# Patient Record
Sex: FEMALE | Race: WHITE | NOT HISPANIC OR LATINO | Employment: OTHER | URBAN - NONMETROPOLITAN AREA
[De-identification: names, ages, dates, MRNs, and addresses within clinical notes are randomized per-mention and may not be internally consistent; named-entity substitution may affect disease eponyms.]

---

## 2018-04-30 ENCOUNTER — TRANSCRIBE ORDERS (OUTPATIENT)
Dept: CARDIOLOGY | Facility: CLINIC | Age: 43
End: 2018-04-30

## 2018-04-30 DIAGNOSIS — R00.2 PALPITATIONS: ICD-10-CM

## 2018-04-30 DIAGNOSIS — R55 SYNCOPE AND COLLAPSE: Primary | ICD-10-CM

## 2018-05-03 ENCOUNTER — CONSULT (OUTPATIENT)
Dept: CARDIOLOGY | Facility: CLINIC | Age: 43
End: 2018-05-03

## 2018-05-03 VITALS
DIASTOLIC BLOOD PRESSURE: 70 MMHG | SYSTOLIC BLOOD PRESSURE: 100 MMHG | HEIGHT: 60 IN | HEART RATE: 58 BPM | BODY MASS INDEX: 32.98 KG/M2 | WEIGHT: 168 LBS

## 2018-05-03 DIAGNOSIS — R56.9 SEIZURE (HCC): ICD-10-CM

## 2018-05-03 DIAGNOSIS — R00.2 PALPITATIONS: ICD-10-CM

## 2018-05-03 DIAGNOSIS — R42 DIZZINESS: ICD-10-CM

## 2018-05-03 DIAGNOSIS — R00.1 BRADYCARDIA: ICD-10-CM

## 2018-05-03 DIAGNOSIS — R55 SYNCOPE, UNSPECIFIED SYNCOPE TYPE: Primary | ICD-10-CM

## 2018-05-03 DIAGNOSIS — R01.1 MURMUR, CARDIAC: ICD-10-CM

## 2018-05-03 DIAGNOSIS — R07.2 PRECORDIAL PAIN: ICD-10-CM

## 2018-05-03 DIAGNOSIS — R06.02 SHORTNESS OF BREATH: ICD-10-CM

## 2018-05-03 PROCEDURE — 93000 ELECTROCARDIOGRAM COMPLETE: CPT | Performed by: INTERNAL MEDICINE

## 2018-05-03 PROCEDURE — 99204 OFFICE O/P NEW MOD 45 MIN: CPT | Performed by: INTERNAL MEDICINE

## 2018-05-03 RX ORDER — CETIRIZINE HYDROCHLORIDE 10 MG/1
10 TABLET ORAL DAILY
COMMUNITY

## 2018-05-03 RX ORDER — ESTRADIOL 1 MG/1
1 TABLET ORAL DAILY
COMMUNITY
End: 2020-07-15 | Stop reason: ALTCHOICE

## 2018-05-03 RX ORDER — DOXEPIN HYDROCHLORIDE 25 MG/1
25 CAPSULE ORAL NIGHTLY
COMMUNITY
End: 2018-06-14 | Stop reason: ALTCHOICE

## 2018-05-03 RX ORDER — CLONAZEPAM 1 MG/1
1 TABLET ORAL NIGHTLY PRN
COMMUNITY
End: 2020-07-15 | Stop reason: ALTCHOICE

## 2018-05-03 RX ORDER — OMEPRAZOLE 20 MG/1
20 CAPSULE, DELAYED RELEASE ORAL DAILY
COMMUNITY
End: 2022-07-14 | Stop reason: SDUPTHER

## 2018-05-03 RX ORDER — IBUPROFEN 800 MG/1
800 TABLET ORAL 3 TIMES DAILY
COMMUNITY
End: 2018-06-14 | Stop reason: ALTCHOICE

## 2018-05-03 RX ORDER — GABAPENTIN 300 MG/1
300 CAPSULE ORAL 3 TIMES DAILY PRN
COMMUNITY
End: 2018-06-14 | Stop reason: ALTCHOICE

## 2018-05-03 RX ORDER — SERTRALINE HYDROCHLORIDE 100 MG/1
TABLET, FILM COATED ORAL
COMMUNITY

## 2018-05-03 NOTE — PROGRESS NOTES
CARDIAC COMPLAINTS  chest pressure/discomfort, dyspnea and syncope      Subjective   Kathy Peña is a 42 y.o. female came in today for her initial cardiac evaluation.  She has history of multiple medical problem in the form of PTSD, depression, history of seizures who recently started having palpitation, chest pain and syncope.  It started few months ago.  She had multiple episodes of syncope and most of them are preceded by chest pain.  It is a pressure-like feeling in the middle of the chest and then she started noticing palpitation in the form of heart skipping.  Some of these episodes or followed by syncope.  Apparently couple of them happened while she was talking to her .  She apparently become unresponsive, not focusing and couple of times she has fallen..  It normally lasts only for a few seconds to less than a minute but is occurring more frequently.  When she wakes up from these symptoms she appears to be confused for a few minutes and also has some headache.  She does have an appointment to see a neurologist in the next few months.  She did have some lab work done.  The electrolytes thyroid function tests were all normal.  She doesn't remember having her prolactin level checked after these episodes.  She had a Holter monitor placed which shows sinus bradycardia.  She did have a low heart rate of 38 BPM early in the morning.  She did not have any symptoms on that day.  She quit smoking many years ago.  She is not sure about her biological family.  From what she learned it appears that his history of premature coronary artery disease.  No history of any pacemaker or defibrillator placement.    Past Surgical History:   Procedure Laterality Date   • CONVERTED (HISTORICAL) HOLTER  04/25/2018    @[facility]. ZINA HR- 57 BPM. Minimum- 38 BPM       Current Outpatient Prescriptions   Medication Sig Dispense Refill   • cetirizine (zyrTEC) 10 MG tablet Take 10 mg by mouth Daily.     • clonazePAM (KlonoPIN) 1  MG tablet Take 1 mg by mouth Every Night.     • Divalproex Sodium (DEPAKOTE PO) Take 500 mg by mouth Daily.     • doxepin (SINEquan) 25 MG capsule Take 25 mg by mouth Every Night.     • estradiol (ESTRACE) 1 MG tablet Take 1 mg by mouth Daily.     • gabapentin (NEURONTIN) 300 MG capsule Take 300 mg by mouth 3 (Three) Times a Day As Needed.     • ibuprofen (ADVIL,MOTRIN) 800 MG tablet Take 800 mg by mouth 3 (Three) Times a Day.     • omeprazole (priLOSEC) 20 MG capsule Take 20 mg by mouth Daily.     • sertraline (ZOLOFT) 100 MG tablet Take 200 mg by mouth Daily.       No current facility-administered medications for this visit.            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)    • History of colon resection    • Hx of cholecystectomy    • Hx of tubal ligation    • Hypotension    • Low back pain    • Moderate depressive disorder    • Osteoarthritis    • Palpitations    • PTSD (post-traumatic stress disorder)    • Stroke    • Vitamin D deficiency        History   Smoking Status   • Former Smoker   • Quit date: 5/3/1997   Smokeless Tobacco   • Never Used          Family History   Problem Relation Age of Onset   • Heart disease Mother    • Clotting disorder Mother    • Hypertension Mother    • Hyperlipidemia Mother    • Asthma Mother    • Anemia Mother    • Heart disease Sister    • Hypertension Sister    • Heart attack Maternal Grandmother    • Heart attack Maternal Grandfather    • Heart attack Paternal Grandmother    • Heart attack Paternal Grandfather        Review of Systems   Constitution: Positive for weakness and malaise/fatigue. Negative for decreased appetite.   HENT: Negative for congestion and sore throat.    Eyes: Negative for blurred vision.   Cardiovascular: Positive for chest pain, near-syncope and  "palpitations.   Respiratory: Negative for shortness of breath and snoring.    Endocrine: Negative for cold intolerance and heat intolerance.   Hematologic/Lymphatic: Negative for adenopathy. Does not bruise/bleed easily.   Skin: Negative for itching, nail changes and skin cancer.   Musculoskeletal: Negative for arthritis and myalgias.   Gastrointestinal: Negative for abdominal pain, dysphagia and heartburn.   Genitourinary: Negative for bladder incontinence and frequency.   Neurological: Positive for dizziness and light-headedness. Negative for seizures and vertigo.   Psychiatric/Behavioral: Negative for altered mental status. The patient is nervous/anxious.    Allergic/Immunologic: Negative for environmental allergies and hives.       Diabetes- No  Thyroid- normal    Objective     /70 (BP Location: Left arm)   Pulse 58   Ht 152.4 cm (60\")   Wt 76.2 kg (168 lb)   BMI 32.81 kg/m²     Physical Exam   Constitutional: She is oriented to person, place, and time. She appears well-developed and well-nourished.   HENT:   Head: Normocephalic.   Nose: Nose normal.   Eyes: EOM are normal. Pupils are equal, round, and reactive to light.   Neck: Normal range of motion. Neck supple.   Cardiovascular: Regular rhythm, S1 normal and S2 normal.  Bradycardia present.    Murmur heard.  Pulmonary/Chest: Effort normal and breath sounds normal.   Abdominal: Soft. Bowel sounds are normal.   Musculoskeletal: Normal range of motion. She exhibits no edema.   Neurological: She is alert and oriented to person, place, and time.   Skin: Skin is warm and dry.   Psychiatric: She has a normal mood and affect.         ECG 12 Lead  Date/Time: 5/3/2018 12:44 PM  Performed by: STELLA ARTEAGA  Authorized by: STELLA ARTEAGA   Previous ECG: no previous ECG available  Rhythm: sinus bradycardia  Rate: bradycardic  QRS axis: normal  Clinical impression: non-specific ECG              Assessment/Plan     Kathy was seen today for establish " care, chest pain and shortness of breath.    Diagnoses and all orders for this visit:    Syncope, unspecified syncope type  -     Holter Monitor - 72 Hour Up To 21 Days; Future    Dizziness  -     Stress Test With Myocardial Perfusion One Day; Future    Precordial pain  -     Stress Test With Myocardial Perfusion One Day; Future    Palpitations    Shortness of breath  -     Adult Transthoracic Echo Complete W/ Cont if Necessary Per Protocol; Future    Bradycardia  -     Holter Monitor - 72 Hour Up To 21 Days; Future    Murmur, cardiac  -     Adult Transthoracic Echo Complete W/ Cont if Necessary Per Protocol; Future    Seizure       At baseline she is bradycardic, blood pressure is lower limit of normal.  Her BMI is 339.  Her EKG showed sinus bradycardia with nonspecific ST-T changes.  Her clinical examination reveals short systolic murmur at the mitral area.  I explained to her that some of the symptoms could be related to bradycardia and may be sick sinus syndrome.  I like to play some of the monitor on her but it should be for 3-4 days.  If there is any symptoms while she was wearing it, and if she has difficult bradycardia or AV block, she may need a pacemaker.  If there is no significant arrhythmia noted during her symptoms, then she may need a tilt table.  Also advised her to have the prolactin level checked immediately after the syncope to rule out seizures causing this symptom.  She also need a stress test to evaluate any stress-induced arrhythmia as well as stress-induced ischemia.  She also need an echocardiogram to rule out any structural heart disease.  Based on the results of these tests, further recommendations will be made.  I also talked to her about her weight.  Discussed with the diet and exercise program.             Electronically signed by Kalie Conde MD May 3, 2018 12:33 PM

## 2018-05-03 NOTE — PROGRESS NOTES
Patient's Body mass index is 32.81 kg/m². BMI is above normal parameters. Follow-up plan includes:  exercise counseling and nutrition counseling.

## 2018-05-08 ENCOUNTER — OUTSIDE FACILITY SERVICE (OUTPATIENT)
Dept: CARDIOLOGY | Facility: CLINIC | Age: 43
End: 2018-05-08

## 2018-05-08 ENCOUNTER — HOSPITAL ENCOUNTER (OUTPATIENT)
Dept: CARDIOLOGY | Facility: HOSPITAL | Age: 43
Discharge: HOME | End: 2018-05-08
Attending: INTERNAL MEDICINE

## 2018-05-08 ENCOUNTER — CLINICAL SUPPORT (OUTPATIENT)
Dept: CARDIOLOGY | Facility: CLINIC | Age: 43
End: 2018-05-08

## 2018-05-08 DIAGNOSIS — R06.02 SHORTNESS OF BREATH: ICD-10-CM

## 2018-05-08 DIAGNOSIS — R01.1 MURMUR, CARDIAC: ICD-10-CM

## 2018-05-08 DIAGNOSIS — R42 DIZZINESS: ICD-10-CM

## 2018-05-08 DIAGNOSIS — R55 SYNCOPE, UNSPECIFIED SYNCOPE TYPE: ICD-10-CM

## 2018-05-08 DIAGNOSIS — R07.2 PRECORDIAL PAIN: ICD-10-CM

## 2018-05-08 LAB
MAXIMAL PREDICTED HEART RATE: 180 BPM
MAXIMAL PREDICTED HEART RATE: 180 BPM
STRESS TARGET HR: 153 BPM
STRESS TARGET HR: 153 BPM

## 2018-05-08 PROCEDURE — 93306 TTE W/DOPPLER COMPLETE: CPT

## 2018-05-08 PROCEDURE — A9500 TC99M SESTAMIBI: HCPCS | Performed by: INTERNAL MEDICINE

## 2018-05-08 PROCEDURE — 0296T PR EXT ECG > 48HR TO 21 DAY RCRD W/CONECT INTL RCRD: CPT | Performed by: INTERNAL MEDICINE

## 2018-05-08 PROCEDURE — 78452 HT MUSCLE IMAGE SPECT MULT: CPT | Performed by: INTERNAL MEDICINE

## 2018-05-08 PROCEDURE — 93306 TTE W/DOPPLER COMPLETE: CPT | Performed by: INTERNAL MEDICINE

## 2018-05-08 PROCEDURE — 0 TECHNETIUM SESTAMIBI: Performed by: INTERNAL MEDICINE

## 2018-05-08 PROCEDURE — 93017 CV STRESS TEST TRACING ONLY: CPT

## 2018-05-08 PROCEDURE — 93018 CV STRESS TEST I&R ONLY: CPT | Performed by: INTERNAL MEDICINE

## 2018-05-08 PROCEDURE — 78452 HT MUSCLE IMAGE SPECT MULT: CPT

## 2018-05-08 RX ADMIN — TECHNETIUM TC 99M SESTAMIBI 1 DOSE: 1 INJECTION INTRAVENOUS at 13:15

## 2018-05-10 ENCOUNTER — TELEPHONE (OUTPATIENT)
Dept: CARDIOLOGY | Facility: CLINIC | Age: 43
End: 2018-05-10

## 2018-05-10 NOTE — TELEPHONE ENCOUNTER
Patient aware of stress test and echo results and recommendations.  No definite evidence of ischemia, normal LV function.  At this time, continue home medications.

## 2018-05-14 ENCOUNTER — OUTSIDE FACILITY SERVICE (OUTPATIENT)
Dept: CARDIOLOGY | Facility: CLINIC | Age: 43
End: 2018-05-14

## 2018-05-14 PROCEDURE — 0298T PR EXT ECG > 48HR TO 21 DAY REVIEW AND INTERPRETATN: CPT | Performed by: INTERNAL MEDICINE

## 2018-05-17 DIAGNOSIS — R55 SYNCOPE, UNSPECIFIED SYNCOPE TYPE: Primary | ICD-10-CM

## 2018-05-17 NOTE — PROGRESS NOTES
Pt aware of results and recommendations. States she would be willing to go where ever you feel she needs to for the tilt table.

## 2018-05-23 ENCOUNTER — HOSPITAL ENCOUNTER (OUTPATIENT)
Dept: CARDIOLOGY | Facility: HOSPITAL | Age: 43
Discharge: HOME | End: 2018-05-23
Admitting: INTERNAL MEDICINE

## 2018-05-23 DIAGNOSIS — R55 SYNCOPE, UNSPECIFIED SYNCOPE TYPE: ICD-10-CM

## 2018-05-23 LAB — BH CV TILT AGENT USED: NORMAL

## 2018-05-23 PROCEDURE — 93660 TILT TABLE EVALUATION: CPT | Performed by: INTERNAL MEDICINE

## 2018-05-23 PROCEDURE — 93660 TILT TABLE EVALUATION: CPT

## 2018-05-23 RX ADMIN — SODIUM CHLORIDE 1 MCG/MIN: 0.9 INJECTION, SOLUTION INTRAVENOUS at 09:10

## 2018-05-24 ENCOUNTER — TELEPHONE (OUTPATIENT)
Dept: CARDIOLOGY | Facility: CLINIC | Age: 43
End: 2018-05-24

## 2018-05-24 RX ORDER — MIDODRINE HYDROCHLORIDE 5 MG/1
5 TABLET ORAL 3 TIMES DAILY
Qty: 90 TABLET | Refills: 6 | Status: SHIPPED | OUTPATIENT
Start: 2018-05-24 | End: 2018-06-14 | Stop reason: SDUPTHER

## 2018-05-24 NOTE — PROGRESS NOTES
Pt aware of results and recommendations. Proamatine 5 mg TID sent to [facility] in [place]. Wanted appointment moved out a couple weeks to see if med is working before coming back. Aware to call if she has any more syncopal episodes after starting medication.

## 2018-05-24 NOTE — TELEPHONE ENCOUNTER
----- Message from Kalie Conde MD sent at 5/24/2018 10:31 AM EDT -----  Copy to PCP. ProAmatine 5 mg TID

## 2018-06-14 ENCOUNTER — OFFICE VISIT (OUTPATIENT)
Dept: CARDIOLOGY | Facility: CLINIC | Age: 43
End: 2018-06-14

## 2018-06-14 ENCOUNTER — CLINICAL SUPPORT (OUTPATIENT)
Dept: CARDIOLOGY | Facility: CLINIC | Age: 43
End: 2018-06-14

## 2018-06-14 DIAGNOSIS — M62.89 MUSCLE FATIGUE: ICD-10-CM

## 2018-06-14 DIAGNOSIS — R00.2 PALPITATIONS: ICD-10-CM

## 2018-06-14 DIAGNOSIS — I95.81 POSTPROCEDURAL HYPOTENSION: ICD-10-CM

## 2018-06-14 DIAGNOSIS — R53.1 WEAKNESS: ICD-10-CM

## 2018-06-14 DIAGNOSIS — R55 SYNCOPE, UNSPECIFIED SYNCOPE TYPE: ICD-10-CM

## 2018-06-14 DIAGNOSIS — R55 SYNCOPE, UNSPECIFIED SYNCOPE TYPE: Primary | ICD-10-CM

## 2018-06-14 PROCEDURE — 0296T PR EXT ECG > 48HR TO 21 DAY RCRD W/CONECT INTL RCRD: CPT | Performed by: INTERNAL MEDICINE

## 2018-06-14 PROCEDURE — 99214 OFFICE O/P EST MOD 30 MIN: CPT | Performed by: NURSE PRACTITIONER

## 2018-06-14 RX ORDER — MIDODRINE HYDROCHLORIDE 5 MG/1
10 TABLET ORAL 3 TIMES DAILY
Qty: 540 TABLET | Refills: 1 | Status: SHIPPED | OUTPATIENT
Start: 2018-06-14 | End: 2018-07-10 | Stop reason: DRUGHIGH

## 2018-06-14 RX ORDER — ACETAMINOPHEN 500 MG
500 TABLET ORAL EVERY 6 HOURS PRN
COMMUNITY
End: 2018-07-10 | Stop reason: ALTCHOICE

## 2018-06-14 NOTE — PROGRESS NOTES
"Chief Complaint   Patient presents with   • Follow-up     Cardiac management. She reports that she is no longer driving due to syncope episodes. She reports unable to see change in B/P with use of Midodrine. She reports had colonscopy and EDG 5/31/18.   • Dizziness     She feels has increase some since last visit, she reports 3-4 syncope episodes. She reports notes more with stress, also increases when she goes outside in the heat.   • Shortness of Breath     She reports about the same.   • Chest Pain     She states \"has lighten since last visit\", will occasional have pressure to mid chest.       Subjective       Kathy Peña is a 42 y.o. female with a complex history of PTSD, depression, and history of seizures secondary to head trauma at age [age].  She has a history of rare episode of presyncope and syncope in the past but for the last several months, the syncope has become more frequent and associated with chest pressure and palpitations. Typically, when the episodes occur, she feels dizzy, vision gets hazy, and she feels like she is going to pass out. She is unconscious for few seconds to a couple of minutes then she is arousable. No tremors or jerking noted, no involuntary micturition/defecation.  She saw Dr. Conde for her initial consult on 5/3/18. Las were reported as normal but none recent. Holter monitor at [facility] prior to consult showed sinus melissa with no significant pause or arrhythmia and repeated 4 day monitor here which was essentially the same. She did not have syncope while wearing monitor. Nuclear stress showed no ischemia and normal LV function. She was referred for tilt table test which was positive for neurocardiogenic syncope.  She was started on midodrine 5 mg TID.  She came in today for follow up.  She continues to have syncope with 3-4 episodes in the last month. She can't tell that midodrine has helped. She had an appointment with neurologist but had to r/s and has not seen yet.      " HPI         Cardiac History:    Past Surgical History:   Procedure Laterality Date   • CARDIOVASCULAR STRESS TEST  05/08/2018    (Mod) 7 Min, 27 Secs. 7.7 METS. 76% THR. BP- 161/79. Breast Attenuation.   • CONVERTED (HISTORICAL) HOLTER  04/25/2018    @[facility]. AVG HR- 57 BPM. Minimum- 38 BPM   • CONVERTED (HISTORICAL) HOLTER  05/08/2018    4 day- avg 61 bpm, multiple sinus melissa, 6 PVC, 1 PAC   • ECHO - CONVERTED  05/08/2018    EF 65%. RVSP- 18 mmHg   • OTHER SURGICAL HISTORY  05/23/2018    Tilt Table Test- positive for neurocardiogenic syncope        Current Outpatient Prescriptions   Medication Sig Dispense Refill   • acetaminophen (TYLENOL) 500 MG tablet Take 500 mg by mouth Every 6 (Six) Hours As Needed for Mild Pain .     • cetirizine (zyrTEC) 10 MG tablet Take 10 mg by mouth Daily.     • clonazePAM (KlonoPIN) 1 MG tablet Take 1 mg by mouth At Night As Needed.     • estradiol (ESTRACE) 1 MG tablet Take 1 mg by mouth Daily.     • midodrine (PROAMATINE) 5 MG tablet Take 2 tablets by mouth 3 (Three) Times a Day. Increase to 10 mg  tablet 1   • omeprazole (priLOSEC) 20 MG capsule Take 20 mg by mouth Daily.     • sertraline (ZOLOFT) 100 MG tablet Take 200 mg by mouth Daily.       No current facility-administered medications for this visit.        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     Social History Main Topics   • Smoking status: Former Smoker     Quit date: 5/3/1997   • Smokeless tobacco: Never Used   • Alcohol use No   • Drug use: No   • Sexual activity: Not on file     Other Topics Concern   • Not on file     Social History Narrative   • No narrative on file       Family History   Problem Relation Age of Onset   • Heart disease Mother    • Clotting disorder Mother    • Hypertension Mother    • Hyperlipidemia Mother    • Asthma Mother    • Anemia Mother    • Heart disease Sister    • Hypertension Sister    • Heart attack Maternal Grandmother    • Heart attack Maternal Grandfather    • Heart attack Paternal Grandmother    • Heart attack Paternal Grandfather        Review of Systems   Constitution: Positive for weakness and malaise/fatigue. Negative for decreased appetite.   HENT: Negative.    Eyes: Negative.    Cardiovascular: Positive for syncope. Negative for chest pain, leg swelling and orthopnea.   Respiratory: Negative for cough and shortness of breath.    Endocrine: Negative.    Hematologic/Lymphatic: Negative for bleeding problem. Does not bruise/bleed easily.   Skin: Negative.    Musculoskeletal: Positive for falls.   Gastrointestinal: Positive for constipation (r/t IBS) and diarrhea. Negative for abdominal pain, melena and nausea.   Neurological: Positive for dizziness.   Psychiatric/Behavioral: Positive for depression (by history). Negative for altered mental status.   Allergic/Immunologic: Negative.       Diabetes- No  Thyroid-normal    Objective     BP 92/62 (BP Location: Right arm) Comment: 86/60- Left arm  Pulse 64   Ht 152.4 cm (60\")   Wt 75.8 kg (167 lb)   BMI 32.61 kg/m²     Physical Exam   Constitutional: She is oriented to person, place, and time. She appears well-developed and well-nourished.   HENT:   Head: Normocephalic.   Eyes: Pupils are equal, round, and reactive to light.   Neck: Normal range of motion. "   Cardiovascular: Normal rate and regular rhythm.    Pulmonary/Chest: Effort normal and breath sounds normal. No respiratory distress. She has no wheezes. She has no rales.   Abdominal: Soft. Bowel sounds are normal.   Musculoskeletal: Normal range of motion. She exhibits no edema.   Neurological: She is alert and oriented to person, place, and time. No cranial nerve deficit.   Skin: Skin is warm and dry.   Psychiatric: She has a normal mood and affect.   Nursing note and vitals reviewed.     Procedures          Assessment/Plan    Heart rate stable.  She is hypotensive today.  She reports blood pressure remains low at home with readings as low as 70 systolic.  We reviewed the findings of her work up with holter not showing any significant arrhythmia. The tilt table test was positive for neurocardiogenic syncope. Discussed with Dr. Conde.  Advised to increase midodrine to 10 mg TID.  Increase fluid intake. Increase sodium intake. Will place extended monitor for 3 weeks to evaluate rhythm during syncope.  We also discussed loop recorder.  Labs will be repeated to check CMP, mag, TSH, B12, folate, and vitamin D. Apparently CBC was checked 2 weeks ago and normal. Will see her back in four weeks after event monitor and labs are complete to re-evaluate. She is advised to keep appointment with neurology.  She was counseled on safety precautions to prevent injury from falls.  She is not driving.   Kathy was seen today for follow-up, dizziness, shortness of breath and chest pain.    Diagnoses and all orders for this visit:    Syncope, unspecified syncope type  -     Magnesium; Future  -     TSH; Future  -     Comprehensive Metabolic Panel; Future  -     Vitamin B12 & Folate; Future  -     Vitamin D 1,25 Dihydroxy; Future  -     Holter Monitor - 72 Hour Up To 21 Days; Future    Weakness  -     Magnesium; Future  -     TSH; Future  -     Comprehensive Metabolic Panel; Future  -     Vitamin B12 & Folate; Future  -      Vitamin D 1,25 Dihydroxy; Future    Postprocedural hypotension  -     Magnesium; Future  -     TSH; Future  -     Comprehensive Metabolic Panel; Future  -     Vitamin B12 & Folate; Future  -     Vitamin D 1,25 Dihydroxy; Future    Palpitations  -     Magnesium; Future  -     TSH; Future  -     Comprehensive Metabolic Panel; Future  -     Vitamin B12 & Folate; Future  -     Vitamin D 1,25 Dihydroxy; Future  -     Holter Monitor - 72 Hour Up To 21 Days; Future    Muscle fatigue  -     TSH; Future  -     Vitamin B12 & Folate; Future  -     Vitamin D 1,25 Dihydroxy; Future    Other orders  -     midodrine (PROAMATINE) 5 MG tablet; Take 2 tablets by mouth 3 (Three) Times a Day. Increase to 10 mg TID        Patient's Body mass index is 32.61 kg/m². BMI is above normal parameters. Recommendations include: nutrition counseling.                     Electronically signed by ROSE Cope,  Lina 15, 2018 1:39 PM

## 2018-06-15 ENCOUNTER — TELEPHONE (OUTPATIENT)
Dept: CARDIOLOGY | Facility: CLINIC | Age: 43
End: 2018-06-15

## 2018-06-15 VITALS
DIASTOLIC BLOOD PRESSURE: 62 MMHG | HEIGHT: 60 IN | HEART RATE: 64 BPM | BODY MASS INDEX: 32.79 KG/M2 | WEIGHT: 167 LBS | SYSTOLIC BLOOD PRESSURE: 92 MMHG

## 2018-07-05 ENCOUNTER — OUTSIDE FACILITY SERVICE (OUTPATIENT)
Dept: CARDIOLOGY | Facility: CLINIC | Age: 43
End: 2018-07-05

## 2018-07-05 PROCEDURE — 0298T PR EXT ECG > 48HR TO 21 DAY REVIEW AND INTERPRETATN: CPT | Performed by: INTERNAL MEDICINE

## 2018-07-10 ENCOUNTER — OFFICE VISIT (OUTPATIENT)
Dept: CARDIOLOGY | Facility: CLINIC | Age: 43
End: 2018-07-10

## 2018-07-10 VITALS
HEIGHT: 60 IN | DIASTOLIC BLOOD PRESSURE: 60 MMHG | HEART RATE: 59 BPM | SYSTOLIC BLOOD PRESSURE: 96 MMHG | WEIGHT: 167 LBS | BODY MASS INDEX: 32.79 KG/M2

## 2018-07-10 DIAGNOSIS — R00.2 PALPITATIONS: ICD-10-CM

## 2018-07-10 DIAGNOSIS — R06.02 SHORTNESS OF BREATH: ICD-10-CM

## 2018-07-10 DIAGNOSIS — R55 SYNCOPE, UNSPECIFIED SYNCOPE TYPE: Primary | ICD-10-CM

## 2018-07-10 DIAGNOSIS — R00.1 BRADYCARDIA: ICD-10-CM

## 2018-07-10 DIAGNOSIS — R42 DIZZINESS: ICD-10-CM

## 2018-07-10 PROCEDURE — 93000 ELECTROCARDIOGRAM COMPLETE: CPT | Performed by: INTERNAL MEDICINE

## 2018-07-10 PROCEDURE — 99214 OFFICE O/P EST MOD 30 MIN: CPT | Performed by: INTERNAL MEDICINE

## 2018-07-10 RX ORDER — AMITRIPTYLINE HYDROCHLORIDE 25 MG/1
25 TABLET, FILM COATED ORAL NIGHTLY
COMMUNITY
End: 2020-01-29 | Stop reason: ALTCHOICE

## 2018-07-10 RX ORDER — TOPIRAMATE 50 MG/1
50 TABLET, FILM COATED ORAL 2 TIMES DAILY
COMMUNITY
End: 2020-07-15 | Stop reason: ALTCHOICE

## 2018-07-10 RX ORDER — BUTALBITAL, ACETAMINOPHEN AND CAFFEINE 50; 325; 40 MG/1; MG/1; MG/1
TABLET ORAL
COMMUNITY
End: 2018-10-09 | Stop reason: ALTCHOICE

## 2018-07-10 RX ORDER — MIDODRINE HYDROCHLORIDE 10 MG/1
10 TABLET ORAL 3 TIMES DAILY
COMMUNITY
End: 2019-05-15 | Stop reason: SDUPTHER

## 2018-07-10 RX ORDER — DICYCLOMINE HYDROCHLORIDE 10 MG/1
10 CAPSULE ORAL 3 TIMES DAILY PRN
COMMUNITY
End: 2020-08-28

## 2018-07-10 NOTE — PROGRESS NOTES
Chief Complaint   Patient presents with   • Follow-up     for cardiac management   • Slow Heart Rate     still having problems with heart rate being low, usually runs in 30's -40's.  Holter report in chart.   • Neurologist     MRI and MRA's were all normal of head, neck and back.  Has scheduled her for Botox since nothing else is working.    • Hypotension     BP doing better since increasing her midodrine, 80's/50's   • Aspirin     pt does not take a daily aspirin     2  CARDIAC COMPLAINTS  Dizziness and syncope        Subjective   Kathy Peña is a 42 y.o. female came in today for her follow-up visit.  She has been having episodes of dizziness and syncopal episodes.  She has undergone numerous testings this year alone including Holter monitor.  She never had any symptoms while she was having a monitor.  The last time and she wore a monitor she did have at 2.1 second has but she was completely asymptomatic at that time.  She also had a tilt table testing done where she did have a syncopal episode without any change in her heart rate.  Her blood pressure did drop.  She was put on Florinef which was changed ProAmatine and increase to the maximum dose.  She came today stating that she still continues to have some episodes of dizziness and had 2 syncopal episode.  One of them when she woke up her blood pressure was 73/36 with a heart rate of 33.              Cardiac History  Past Surgical History:   Procedure Laterality Date   • CARDIOVASCULAR STRESS TEST  05/08/2018    (Mod) 7 Min, 27 Secs. 7.7 METS. 76% THR. BP- 161/79. Breast Attenuation.   • CONVERTED (HISTORICAL) HOLTER  04/25/2018    @[facility]. AVG HR- 57 BPM. Minimum- 38 BPM   • CONVERTED (HISTORICAL) HOLTER  05/08/2018    4 day- avg 61 bpm, multiple sinus melissa, 6 PVC, 1 PAC   • CONVERTED (HISTORICAL) HOLTER  07/05/2018    AVG HR 48 BPM. Min 31 BPM. 2 pauses W/O symptoms.One ideoventricular run at 43 BPM   • ECHO - CONVERTED  05/08/2018    EF 65%. RVSP- 18 mmHg    • OTHER SURGICAL HISTORY  05/23/2018    Tilt Table Test- positive for neurocardiogenic syncope        Current Outpatient Prescriptions   Medication Sig Dispense Refill   • amitriptyline (ELAVIL) 25 MG tablet Take 25 mg by mouth Every Night.     • butalbital-acetaminophen-caffeine (FIORICET, ESGIC) -40 MG per tablet 1-2 tabs every 4 hours prn     • cetirizine (zyrTEC) 10 MG tablet Take 10 mg by mouth Daily.     • clonazePAM (KlonoPIN) 1 MG tablet Take 1 mg by mouth At Night As Needed.     • dicyclomine (BENTYL) 10 MG capsule Take 10 mg by mouth 3 (Three) Times a Day As Needed.     • estradiol (ESTRACE) 1 MG tablet Take 1 mg by mouth Daily.     • midodrine (PROAMATINE) 10 MG tablet Take 10 mg by mouth 3 (Three) Times a Day.     • omeprazole (priLOSEC) 20 MG capsule Take 20 mg by mouth Daily.     • sertraline (ZOLOFT) 100 MG tablet 2 tabs daily     • topiramate (TOPAMAX) 50 MG tablet Take 50 mg by mouth 3 (Three) Times a Day.       No current facility-administered medications for this visit.        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     Social History Main Topics   • Smoking status: Former Smoker     Quit date: 5/3/1997   • Smokeless tobacco: Never Used   • Alcohol use No   • Drug use: No   • Sexual activity:  "Not on file     Other Topics Concern   • Not on file     Social History Narrative   • No narrative on file       Family History   Problem Relation Age of Onset   • Heart disease Mother    • Clotting disorder Mother    • Hypertension Mother    • Hyperlipidemia Mother    • Asthma Mother    • Anemia Mother    • Heart disease Sister    • Hypertension Sister    • Heart attack Maternal Grandmother    • Heart attack Maternal Grandfather    • Heart attack Paternal Grandmother    • Heart attack Paternal Grandfather        Review of Systems   Constitution: Positive for malaise/fatigue. Negative for decreased appetite.   HENT: Negative for congestion and sore throat.    Eyes: Negative for blurred vision.   Cardiovascular: Positive for dyspnea on exertion and near-syncope. Negative for chest pain.   Respiratory: Negative for shortness of breath and snoring.    Endocrine: Negative for cold intolerance and heat intolerance.   Hematologic/Lymphatic: Negative for adenopathy. Does not bruise/bleed easily.   Skin: Negative for itching, nail changes and skin cancer.   Musculoskeletal: Negative for arthritis and myalgias.   Gastrointestinal: Negative for abdominal pain, dysphagia and heartburn.   Genitourinary: Negative for bladder incontinence and frequency.   Neurological: Positive for dizziness, headaches and light-headedness. Negative for seizures and vertigo.   Psychiatric/Behavioral: Negative for altered mental status.   Allergic/Immunologic: Negative for environmental allergies and hives.       Diabetes- No  Thyroid- normal    Objective     BP 96/60 (BP Location: Right arm)   Pulse 59   Ht 152.4 cm (60\")   Wt 75.8 kg (167 lb)   BMI 32.61 kg/m²     Physical Exam   Constitutional: She is oriented to person, place, and time. She appears well-developed and well-nourished.   HENT:   Head: Normocephalic.   Nose: Nose normal.   Eyes: EOM are normal. Pupils are equal, round, and reactive to light.   Neck: Normal range of motion. " Neck supple.   Cardiovascular: Normal rate, regular rhythm, S1 normal and S2 normal.    Murmur heard.  Pulmonary/Chest: Effort normal and breath sounds normal.   Abdominal: Soft. Bowel sounds are normal.   Musculoskeletal: Normal range of motion. She exhibits no edema.   Neurological: She is alert and oriented to person, place, and time.   Skin: Skin is warm and dry.   Psychiatric: She has a normal mood and affect.       ECG 12 Lead  Date/Time: 7/10/2018 4:56 PM  Performed by: STELLA ARTEAGA  Authorized by: STELLA ARTEAGA   Comparison: compared with previous ECG from 5/3/2018  Similar to previous ECG  Rhythm: sinus bradycardia  Rate: bradycardic  QRS axis: normal  Clinical impression: non-specific ECG                    Assessment/Plan   Patient's Body mass index is 32.61 kg/m². BMI is above normal parameters. Recommendations include: educational material, exercise counseling and nutrition counseling.     Kathy was seen today for follow-up, slow heart rate, neurologist, hypotension and aspirin.    Diagnoses and all orders for this visit:    Syncope, unspecified syncope type  -     Ambulatory Referral to Cardiology    Palpitations  -     Ambulatory Referral to Cardiology    Bradycardia  -     Ambulatory Referral to Cardiology    Shortness of breath    Dizziness     At baseline, her heart rate is stable.  Blood pressure lower limit of normal.  Her EKG shows sinus bradycardia with nonspecific ST-T changes.  I had a long talk with her and her .  I explained to them about all the findings.  I'm going to arrange for a loop recorder to see whether she has any significant bradycardia or pause during these syncopal episode.  If that so, then she may need to have a pacemaker.  If the syncopal episode is secondary to hypotension then need to consider starting her on Northera.  Based on the findings, further recommendations will be made.  Would try to get approval through her insurance company for the  medicine.  Her BMI is still around 32.  I talked to her again about diet, exercise and weight reduction.  Regarding her migraine, she is advised to talk to the neurologist about CGRP inhibitors.  She is not on any aspirin since there is no evidence of ischemic event.                    Electronically signed by Kalie Conde MD July 10, 2018 4:40 PM

## 2018-07-16 ENCOUNTER — PRIOR AUTHORIZATION (OUTPATIENT)
Dept: CARDIOLOGY | Facility: CLINIC | Age: 43
End: 2018-07-16

## 2018-08-13 ENCOUNTER — TELEPHONE (OUTPATIENT)
Dept: CARDIOLOGY | Facility: CLINIC | Age: 43
End: 2018-08-13

## 2018-08-13 ENCOUNTER — OFFICE VISIT (OUTPATIENT)
Dept: CARDIOLOGY | Facility: CLINIC | Age: 43
End: 2018-08-13

## 2018-08-13 DIAGNOSIS — R00.2 PALPITATIONS: ICD-10-CM

## 2018-08-13 DIAGNOSIS — I49.5 SSS (SICK SINUS SYNDROME) (HCC): Primary | ICD-10-CM

## 2018-08-13 PROCEDURE — 93280 PM DEVICE PROGR EVAL DUAL: CPT | Performed by: INTERNAL MEDICINE

## 2018-08-13 NOTE — TELEPHONE ENCOUNTER
Patient had alert on Merlin for high VT rate on Saturday 8/12/18, but no report is available to pull up secondary the device has temporarily suspended high speed telemetry secondary to battery capacity per Merlin.      Trying to reach patient to set up [facility] PPM interrogation.  Checking to see if she can come today at 3:30PM

## 2018-08-13 NOTE — TELEPHONE ENCOUNTER
Patient aware, she will be here today at 3:30 PM EST for PPM interrogation.  Dario is aware and will be here for interrogation.

## 2018-08-30 RX ORDER — DROXIDOPA 100 MG/1
CAPSULE ORAL
Qty: 450 CAPSULE | Status: SHIPPED | OUTPATIENT
Start: 2018-08-30 | End: 2018-11-09 | Stop reason: SDUPTHER

## 2018-09-19 ENCOUNTER — TELEPHONE (OUTPATIENT)
Dept: CARDIOLOGY | Facility: CLINIC | Age: 43
End: 2018-09-19

## 2018-09-19 NOTE — TELEPHONE ENCOUNTER
Pt states she is taking Northera 100 mg 6 tabs TID. Has now ran out. How many refills do you want me to give?

## 2018-09-19 NOTE — TELEPHONE ENCOUNTER
"[facility] called asking for the maintenance dose for Northera. Instructions that went to the pharmacy was \" 100 mg, take according to 48 hour titration schedule on the enclosed dosing guide\" They said pt had already received the titration dose that they now need a maintenance dose.   "

## 2018-09-20 NOTE — TELEPHONE ENCOUNTER
Pt states she has been out of medication for a couple days but was on Northera 100 mg 6 tabs TID. States they will overnight the medication. Just making sure if is ok to resume that dose after being off for 2-3 days.  Also how many refills?

## 2018-09-20 NOTE — TELEPHONE ENCOUNTER
Melba, pharmacist at [facility] aware of maintenance dose of   Northera 100 mg 6 tabs TID, 90 days supply with 3 refills.     [phone]

## 2018-10-09 ENCOUNTER — OFFICE VISIT (OUTPATIENT)
Dept: CARDIOLOGY | Facility: CLINIC | Age: 43
End: 2018-10-09

## 2018-10-09 VITALS
WEIGHT: 168 LBS | DIASTOLIC BLOOD PRESSURE: 70 MMHG | HEART RATE: 62 BPM | SYSTOLIC BLOOD PRESSURE: 104 MMHG | HEIGHT: 60 IN | BODY MASS INDEX: 32.98 KG/M2

## 2018-10-09 DIAGNOSIS — R00.2 PALPITATIONS: ICD-10-CM

## 2018-10-09 DIAGNOSIS — E66.9 OBESITY (BMI 30.0-34.9): ICD-10-CM

## 2018-10-09 DIAGNOSIS — I49.5 SSS (SICK SINUS SYNDROME) (HCC): Primary | ICD-10-CM

## 2018-10-09 DIAGNOSIS — G90.3 NEUROGENIC ORTHOSTATIC HYPOTENSION (HCC): ICD-10-CM

## 2018-10-09 DIAGNOSIS — R01.1 MURMUR, CARDIAC: ICD-10-CM

## 2018-10-09 PROCEDURE — 99213 OFFICE O/P EST LOW 20 MIN: CPT | Performed by: NURSE PRACTITIONER

## 2018-10-09 PROCEDURE — 93000 ELECTROCARDIOGRAM COMPLETE: CPT | Performed by: NURSE PRACTITIONER

## 2018-10-09 RX ORDER — GABAPENTIN 300 MG/1
300 CAPSULE ORAL 3 TIMES DAILY
COMMUNITY
End: 2020-07-15 | Stop reason: ALTCHOICE

## 2018-10-09 NOTE — PROGRESS NOTES
Chief Complaint   Patient presents with   • Follow-up     For cardiac management. Patient is not on aspirin. Reports that migraines have stopped since she has PPM implanted.  Last lab work was done on 05/29/18 per PCP, in chart under media.    • Pacemaker Check     Last [facility] PPM check was done on 08/13/18 per our office.    • Med Refill     Does not need refills at this time. Did bring medication list.        Cardiac Complaints  none      Subjective   Kathy Peña is a 42 y.o. female with PTSD, depression, and history of seizures secondary to head trauma at age [age].  She has a history of rare episode of presyncope and syncope but in 2018, associated with chest pressure and palpitations. She saw Dr. Conde for her initial consult on 5/3/18. Las were reported as normal but none recent. Holter monitor at [facility] prior to consult showed sinus melissa with no significant pause or arrhythmia and repeated 4 day monitor here was essentially the same. She did not have syncope while wearing monitor in May. Nuclear stress showed no ischemia and normal LV function. She was referred for tilt table test which was positive for neurocardiogenic syncope.  She was started on midodrine 5 mg TID. It appears northera was added for better blood pressure control TID. Then extended monitor was advised as she continued to report syncopal episodes in June and it showed several episodes of marked bradycardia (lowest 31), (2) 2second pauses, and idioventricular run with rate of 43, and it appears patient was then referred to Dr. Coulter for [facility] PPM placement for SSS.   Our office was notified for high rate VT per Merlin on 8/12/2018 and patient was advised to come in for interrogation.  Investigation done on 8/13/2018 showed no VT but  NST and pacer setting changes were made, adequate battery life was noted with about 74% atrial pacing.  Patient returns today for follow up and states since pacer has been placed she has felt like a new  person.  She reports syncopal episodes as well as her migraines have completely subsided. Patient states she was unaware of just how bad she felt until this was done.  Cardiac symptoms are denied.  She does admit that she is still taking both midodrine and northera for low blood pressure and is tolerating both well.           Cardiac History  Past Surgical History:   Procedure Laterality Date   • CARDIOVASCULAR STRESS TEST  05/08/2018    (Mod) 7 Min, 27 Secs. 7.7 METS. 76% THR. BP- 161/79. Breast Attenuation.   • CONVERTED (HISTORICAL) HOLTER  04/25/2018    @[facility]. AVG HR- 57 BPM. Minimum- 38 BPM   • CONVERTED (HISTORICAL) HOLTER  05/08/2018    4 day- avg 61 bpm, multiple sinus melissa, 6 PVC, 1 PAC   • CONVERTED (HISTORICAL) HOLTER  07/05/2018    AVG HR 48 BPM. Min 31 BPM. 2 pauses W/O symptoms.One ideoventricular run at 43 BPM   • ECHO - CONVERTED  05/08/2018    EF 65%. RVSP- 18 mmHg   • OTHER SURGICAL HISTORY  05/23/2018    Tilt Table Test- positive for neurocardiogenic syncope        Current Outpatient Prescriptions   Medication Sig Dispense Refill   • amitriptyline (ELAVIL) 25 MG tablet Take 25 mg by mouth Every Night.     • cetirizine (zyrTEC) 10 MG tablet Take 10 mg by mouth Daily.     • clonazePAM (KlonoPIN) 1 MG tablet Take 1 mg by mouth At Night As Needed.     • dicyclomine (BENTYL) 10 MG capsule Take 10 mg by mouth 3 (Three) Times a Day As Needed.     • estradiol (ESTRACE) 1 MG tablet Take 1 mg by mouth Daily.     • gabapentin (NEURONTIN) 300 MG capsule Take 300 mg by mouth 2 (Two) Times a Day.     • midodrine (PROAMATINE) 10 MG tablet Take 10 mg by mouth 3 (Three) Times a Day.     • NORTHERA 100 MG capsule TAKE ACCORDING TO 48 HOUR TITRATION SCHEDULE ON THE ENCLOSED DOSING GUIDE 450 capsule PRN   • omeprazole (priLOSEC) 20 MG capsule Take 20 mg by mouth Daily.     • sertraline (ZOLOFT) 100 MG tablet 2 tabs daily     • topiramate (TOPAMAX) 50 MG tablet Take 50 mg by mouth 3 (Three) Times a Day.       No  current facility-administered medications for this visit.        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     Social History Main Topics   • Smoking status: Former Smoker     Quit date: 5/3/1997   • Smokeless tobacco: Never Used   • Alcohol use No   • Drug use: No   • Sexual activity: Not on file     Other Topics Concern   • Not on file     Social History Narrative   • No narrative on file       Family History   Problem Relation Age of Onset   • Heart disease Mother    • Clotting disorder Mother    • Hypertension Mother    • Hyperlipidemia Mother    • Asthma Mother    • Anemia Mother    • Heart disease Sister    • Hypertension Sister    • Heart attack Maternal Grandmother    • Heart attack Maternal Grandfather    • Heart attack Paternal Grandmother    • Heart attack Paternal Grandfather        Review of Systems   Constitution: Negative for weakness and malaise/fatigue.   Cardiovascular: Negative for chest pain, dyspnea on exertion, leg swelling, near-syncope, orthopnea and syncope.   Respiratory: Negative for cough, shortness of breath and wheezing.    Musculoskeletal: Negative for back pain, joint pain and joint swelling.   Gastrointestinal: Negative for anorexia, heartburn, nausea and vomiting.   Genitourinary: Negative for dysuria, hematuria,  "hesitancy and nocturia.   Neurological: Negative for dizziness, light-headedness and loss of balance.   Psychiatric/Behavioral: Negative for depression and memory loss. The patient is not nervous/anxious.            Objective     /70 (BP Location: Right arm)   Pulse 62   Ht 152.4 cm (60\")   Wt 76.2 kg (168 lb)   BMI 32.81 kg/m²     Physical Exam   Constitutional: She is oriented to person, place, and time. She appears well-developed and well-nourished.   HENT:   Head: Normocephalic and atraumatic.   Eyes: Pupils are equal, round, and reactive to light. EOM are normal.   Neck: Normal range of motion. Neck supple.   Cardiovascular: Normal rate and regular rhythm.    Pulmonary/Chest: Effort normal and breath sounds normal.   Abdominal: Soft.   Musculoskeletal: Normal range of motion.   Neurological: She is alert and oriented to person, place, and time.   Skin: Skin is warm and dry.   Psychiatric: She has a normal mood and affect. Her behavior is normal.         ECG 12 Lead  Date/Time: 10/9/2018 1:41 PM  Performed by: PEGGY MATHIAS  Authorized by: PEGGY MATHIAS   Rhythm: paced  BPM: 62              Assessment/Plan     HR and BP are stable.  Hypotension remains well managed on current midodrine and northera.  EKG done today for PPM which was placed for SSS shows an atrial paced rhythm.  No [facility] pacer check will be recommended at this time as pacer was last checked in August.  We will order recheck after next visit.  No new workup will be advised as cardiac symptoms are denied and patient is feeling much better since PPM was placed, pre-syncope/syncope episodes have subsided.  She also denies any more issues with palpitations.  BMI elevated at 32.81, good cardiac diet with limited caloric intake, saturated fat, carbs, and sugars recommended.  6 month follow up scheduled or sooner if needed.      Problems Addressed this Visit        Cardiovascular and Mediastinum    Murmur, cardiac    Palpitations    " SSS (sick sinus syndrome) (CMS/HCC) - Primary    Relevant Orders    ECG 12 Lead      Other Visit Diagnoses     Neurogenic orthostatic hypotension (CMS/HCC)        Obesity (BMI 30.0-34.9)              Patient's Body mass index is 32.81 kg/m². BMI is above normal parameters. Recommendations include: nutrition counseling.          Electronically signed by ROSE Espinal October 9, 2018 4:26 PM

## 2018-11-09 ENCOUNTER — PRIOR AUTHORIZATION (OUTPATIENT)
Dept: CARDIOLOGY | Facility: CLINIC | Age: 43
End: 2018-11-09

## 2018-11-09 RX ORDER — DROXIDOPA 100 MG/1
6 CAPSULE ORAL 3 TIMES DAILY
Qty: 540 CAPSULE | Refills: 6 | Status: SHIPPED | OUTPATIENT
Start: 2018-11-09 | End: 2019-05-15 | Stop reason: SDUPTHER

## 2018-12-12 RX ORDER — MIDODRINE HYDROCHLORIDE 10 MG/1
TABLET ORAL
Qty: 270 TABLET | Refills: 1 | Status: SHIPPED | OUTPATIENT
Start: 2018-12-12 | End: 2019-05-15 | Stop reason: SDUPTHER

## 2018-12-20 ENCOUNTER — TELEPHONE (OUTPATIENT)
Dept: CARDIOLOGY | Facility: CLINIC | Age: 43
End: 2018-12-20

## 2018-12-20 NOTE — TELEPHONE ENCOUNTER
Dr. Conde reviewed Merlin and ordered patient to start Lopressor 25 mg BID secondary to 2 episodes of HR of 155 BPM and 152 BPM.  Patient aware of recommendations.  She does report she has episodes of palpitations at least 1-2 x per week, usually when she is under stressful situation.  Advised patient to monitor BP and call our office if she has any problems.    Pt is taking midodrine and Northera.

## 2019-05-10 NOTE — PROGRESS NOTES
Chief Complaint   Patient presents with   • Follow-up     For cardiac management. Patient is not on aspirin. Reports that she has had some palpitations. Reports that she has had shortness of breath, reports that neurologist diagnosed her with POTS. Last lab work was done in November per neurologist, Dr. Valdez, not in chart.    • Pacemaker Check     Last SJM PPM check was done on 12/09/18 per our office.    • Med Refill     Needs refills on cardiac medications. 90 day supplies to [facility]. Patient verbalized medications.       Cardiac Complaints  dyspnea, Dizziness and palpitations      Subjective   Kathy Peña is a 43 y.o. female with PTSD, depression, and history of seizures secondary to head trauma at age [age]. She saw Dr. Conde for her initial consult on 5/3/18 for palpitations and syncope.  Holter monitor at [facility] prior to consult showed sinus melissa with no significant pause or arrhythmia and repeated 4 day monitor here was essentially the same. She did not have syncope while wearing monitor in May. Nuclear stress showed no ischemia and normal LV function. She was referred for tilt table test which was positive for neurocardiogenic syncope. She was started on midodrine 5 mg TID. It appears northera was added for better blood pressure control TID. Then extended monitor was advised as she continued to report syncopal episodes in June and it showed several episodes of marked bradycardia (lowest 31), (2) 2second pauses, and idioventricular run with rate of 43, and it appears patient was then referred to Dr. Coulter for [facility] PPM placement for SSS.   Our office was notified for high rate VT per Merlin on 8/12/2018 and patient was advised to come in for interrogation.  Investigation done on 8/13/2018 showed no VT but NST and pacer setting changes were made. Most recent pacer check in December showed high ventricular rate x2 and metoprolol was started.  She returns today for follow up and still reports  occasional shortness of breath and palpitations.  When questioned, she admits this is unchanged from prior.  She states neurologist has diagnosed her with POTS, she takes midodrine and northera in regards. She denies any syncopal episodes but does admit to higher sodium intake to help her avoid passing out.  She states she is soon to buy compression stockings and an abdominal binder to aid in management.  Labs are done with PCP and neurology,cardiac meds requested.                Cardiac History  Past Surgical History:   Procedure Laterality Date   • CARDIOVASCULAR STRESS TEST  05/08/2018    (Mod) 7 Min, 27 Secs. 7.7 METS. 76% THR. BP- 161/79. Breast Attenuation.   • CONVERTED (HISTORICAL) HOLTER  04/25/2018    @[facility]. AVG HR- 57 BPM. Minimum- 38 BPM   • CONVERTED (HISTORICAL) HOLTER  05/08/2018    4 day- avg 61 bpm, multiple sinus melissa, 6 PVC, 1 PAC   • CONVERTED (HISTORICAL) HOLTER  07/05/2018    AVG HR 48 BPM. Min 31 BPM. 2 pauses W/O symptoms.One ideoventricular run at 43 BPM   • ECHO - CONVERTED  05/08/2018    EF 65%. RVSP- 18 mmHg   • OTHER SURGICAL HISTORY  05/23/2018    Tilt Table Test- positive for neurocardiogenic syncope        Current Outpatient Medications   Medication Sig Dispense Refill   • albuterol sulfate  (90 Base) MCG/ACT inhaler Inhale 2 puffs Every 4 (Four) Hours As Needed for Wheezing.     • amitriptyline (ELAVIL) 25 MG tablet Take 25 mg by mouth Every Night.     • cetirizine (zyrTEC) 10 MG tablet Take 10 mg by mouth Daily.     • clonazePAM (KlonoPIN) 1 MG tablet Take 1 mg by mouth At Night As Needed.     • dicyclomine (BENTYL) 10 MG capsule Take 10 mg by mouth 3 (Three) Times a Day As Needed.     • estradiol (ESTRACE) 1 MG tablet Take 1 mg by mouth Daily.     • metoprolol tartrate (LOPRESSOR) 25 MG tablet Take 1 tablet by mouth 2 (Two) Times a Day. 180 tablet 2   • midodrine (PROAMATINE) 10 MG tablet Take 1 tablet by mouth 3 (Three) Times a Day. 270 tablet 2   • NORTHERA 100 MG capsule  Take 6 capsules by mouth 3 (Three) Times a Day. 540 capsule 2   • omeprazole (priLOSEC) 20 MG capsule Take 20 mg by mouth Daily.     • sertraline (ZOLOFT) 100 MG tablet 2 tabs daily     • topiramate (TOPAMAX) 50 MG tablet Take 50 mg by mouth 2 (Two) Times a Day.     • gabapentin (NEURONTIN) 300 MG capsule Take 300 mg by mouth 2 (Two) Times a Day.       No current facility-administered medications for this visit.        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)    • History of colon resection    • Hx of cholecystectomy    • Hx of tubal ligation    • Hypotension    • Low back pain    • Moderate depressive disorder    • Osteoarthritis    • Palpitations    • PTSD (post-traumatic stress disorder)    • Stroke (CMS/HCC)    • Vitamin D deficiency        Social History     Socioeconomic History   • Marital status:      Spouse name: Not on file   • Number of children: Not on file   • Years of education: Not on file   • Highest education level: Not on file   Tobacco Use   • Smoking status: Former Smoker     Last attempt to quit: 5/3/1997     Years since quittin.0   • Smokeless tobacco: Never Used   Substance and Sexual Activity   • Alcohol use: No   • Drug use: No       Family History   Problem Relation Age of Onset   • Heart disease Mother    • Clotting disorder Mother    • Hypertension Mother    • Hyperlipidemia Mother    • Asthma Mother    • Anemia Mother    • Heart disease Sister    • Hypertension Sister    • Heart attack Maternal Grandmother    • Heart attack Maternal Grandfather    • Heart attack Paternal Grandmother    • Heart attack Paternal Grandfather        Review of Systems   Constitution: Negative for weakness and malaise/fatigue.   Cardiovascular: Positive for dyspnea on exertion and palpitations.  "Negative for chest pain, claudication, irregular heartbeat, leg swelling, near-syncope, orthopnea and syncope.   Respiratory: Positive for shortness of breath. Negative for cough and wheezing.    Musculoskeletal: Negative for back pain, joint pain and joint swelling.   Gastrointestinal: Negative for anorexia, heartburn, nausea and vomiting.   Genitourinary: Negative for dysuria, hematuria, hesitancy and nocturia.   Neurological: Positive for dizziness and light-headedness. Negative for loss of balance.   Psychiatric/Behavioral: Negative for depression and memory loss. The patient is nervous/anxious.            Objective     /70 (BP Location: Left arm)   Pulse 60   Ht 152.4 cm (60\")   Wt 84.4 kg (186 lb)   BMI 36.33 kg/m²     Physical Exam   Constitutional: She is oriented to person, place, and time. She appears well-developed and well-nourished.   HENT:   Head: Normocephalic and atraumatic.   Eyes: EOM are normal. Pupils are equal, round, and reactive to light.   Neck: Normal range of motion. Neck supple.   Cardiovascular: Normal rate and regular rhythm.   Murmur heard.  Pulmonary/Chest: Effort normal and breath sounds normal.   Abdominal: Soft.   Musculoskeletal: Normal range of motion. She exhibits edema.   Pedal edema +1   Neurological: She is alert and oriented to person, place, and time.   Skin: Skin is warm and dry.   Psychiatric: She has a normal mood and affect. Her behavior is normal.         ECG 12 Lead  Date/Time: 5/15/2019 9:48 AM  Performed by: Delmy Silva APRN  Authorized by: Delmy Silva APRN   Comparison: compared with previous ECG from 10/12/2018  Similar to previous ECG  Rhythm: paced  BPM: 60    Clinical impression: abnormal EKG            Assessment/Plan     HR is stable today.  EKG done for SSS and PPM shows an atrial paced rhythm.  She reports rare palpitations but admits the addition of metoprolol after last pacer check and tachycardia reported has helped.  For now,same " regimen advised.  Repeat pacer check advised.  For POTS syndrome, blood pressure appears stable on current midodrine and northera therapy.  Patient denies any syncopal episodes and no marked hypotension noted.  She will continue to follow with neurology as well for both POTS and seizure disorder. For POTS management, support hose recommended. No new cardiac workup will be recommended at this time as no new or worsening cardiac concerns are voiced.  BMI is elevated at 36.33 and weight gain is noted. Patient states she has been eating a great deal more due to anxiety, as well as higher sodium content for POTS management.  She was encouraged to limit her stress eating, limit carbs, and calories.  She was also urged to be as active as tolerated and get adequate fluid intake.  6 month follow up recommended or sooner if needed.        Problems Addressed this Visit        Cardiovascular and Mediastinum    Murmur, cardiac    Palpitations    SSS (sick sinus syndrome) (CMS/HCC) - Primary    Relevant Medications    metoprolol tartrate (LOPRESSOR) 25 MG tablet    midodrine (PROAMATINE) 10 MG tablet    NORTHERA 100 MG capsule    Other Relevant Orders    ECG 12 Lead       Respiratory    Shortness of breath       Nervous and Auditory    Seizure (CMS/HCC)       Other    Dizziness      Other Visit Diagnoses     POTS (postural orthostatic tachycardia syndrome)        Weight gain        Severe obesity (BMI 35.0-39.9) with comorbidity (CMS/HCC)        Anxiety              Patient's Body mass index is 36.33 kg/m². BMI is above normal parameters. Recommendations include: nutrition counseling.              Electronically signed by ROSE Espinal May 15, 2019 6:30 PM

## 2019-05-15 ENCOUNTER — OFFICE VISIT (OUTPATIENT)
Dept: CARDIOLOGY | Facility: CLINIC | Age: 44
End: 2019-05-15

## 2019-05-15 VITALS
DIASTOLIC BLOOD PRESSURE: 70 MMHG | SYSTOLIC BLOOD PRESSURE: 100 MMHG | BODY MASS INDEX: 36.52 KG/M2 | WEIGHT: 186 LBS | HEART RATE: 60 BPM | HEIGHT: 60 IN

## 2019-05-15 DIAGNOSIS — R42 DIZZINESS: ICD-10-CM

## 2019-05-15 DIAGNOSIS — F41.9 ANXIETY: ICD-10-CM

## 2019-05-15 DIAGNOSIS — G90.A POTS (POSTURAL ORTHOSTATIC TACHYCARDIA SYNDROME): ICD-10-CM

## 2019-05-15 DIAGNOSIS — E66.01 SEVERE OBESITY (BMI 35.0-39.9) WITH COMORBIDITY (HCC): ICD-10-CM

## 2019-05-15 DIAGNOSIS — R00.2 PALPITATIONS: ICD-10-CM

## 2019-05-15 DIAGNOSIS — R01.1 MURMUR, CARDIAC: ICD-10-CM

## 2019-05-15 DIAGNOSIS — R06.02 SHORTNESS OF BREATH: ICD-10-CM

## 2019-05-15 DIAGNOSIS — R63.5 WEIGHT GAIN: ICD-10-CM

## 2019-05-15 DIAGNOSIS — I49.5 SSS (SICK SINUS SYNDROME) (HCC): Primary | ICD-10-CM

## 2019-05-15 DIAGNOSIS — R56.9 SEIZURE (HCC): ICD-10-CM

## 2019-05-15 PROCEDURE — 93000 ELECTROCARDIOGRAM COMPLETE: CPT | Performed by: NURSE PRACTITIONER

## 2019-05-15 PROCEDURE — 99214 OFFICE O/P EST MOD 30 MIN: CPT | Performed by: NURSE PRACTITIONER

## 2019-05-15 RX ORDER — MIDODRINE HYDROCHLORIDE 10 MG/1
10 TABLET ORAL 3 TIMES DAILY
Qty: 270 TABLET | Refills: 2 | Status: SHIPPED | OUTPATIENT
Start: 2019-05-15 | End: 2020-01-29 | Stop reason: ALTCHOICE

## 2019-05-15 RX ORDER — ALBUTEROL SULFATE 90 UG/1
2 AEROSOL, METERED RESPIRATORY (INHALATION) EVERY 4 HOURS PRN
COMMUNITY
End: 2022-01-11

## 2019-05-15 RX ORDER — DROXIDOPA 100 MG/1
6 CAPSULE ORAL 3 TIMES DAILY
Qty: 540 CAPSULE | Refills: 2 | Status: SHIPPED | OUTPATIENT
Start: 2019-05-15 | End: 2019-09-25 | Stop reason: SDUPTHER

## 2019-06-05 ENCOUNTER — OFFICE VISIT (OUTPATIENT)
Dept: CARDIOLOGY | Facility: CLINIC | Age: 44
End: 2019-06-05

## 2019-06-05 DIAGNOSIS — R00.1 BRADYCARDIA: ICD-10-CM

## 2019-06-05 DIAGNOSIS — I49.5 SSS (SICK SINUS SYNDROME) (HCC): Primary | ICD-10-CM

## 2019-06-05 PROCEDURE — 93280 PM DEVICE PROGR EVAL DUAL: CPT | Performed by: INTERNAL MEDICINE

## 2019-09-30 RX ORDER — DROXIDOPA 100 MG/1
CAPSULE ORAL
Qty: 540 CAPSULE | Refills: 0 | Status: SHIPPED | OUTPATIENT
Start: 2019-09-30 | End: 2019-11-06 | Stop reason: SDUPTHER

## 2019-10-21 RX ORDER — DROXIDOPA 100 MG/1
CAPSULE ORAL
Qty: 540 CAPSULE | Refills: 0 | OUTPATIENT
Start: 2019-10-21

## 2019-11-06 RX ORDER — DROXIDOPA 100 MG/1
CAPSULE ORAL
Qty: 540 CAPSULE | Refills: 0 | Status: SHIPPED | OUTPATIENT
Start: 2019-11-06 | End: 2019-12-11 | Stop reason: SDUPTHER

## 2019-12-11 RX ORDER — DROXIDOPA 100 MG/1
CAPSULE ORAL
Qty: 540 CAPSULE | Refills: 0 | Status: SHIPPED | OUTPATIENT
Start: 2019-12-11 | End: 2020-01-29 | Stop reason: SDUPTHER

## 2020-01-24 NOTE — PROGRESS NOTES
Chief Complaint   Patient presents with   • Follow-up     For cardiac management. Patient is on aspirin. States she feels llike her heart is racing. States that she does get short of breath a lot. Last lab work was done last month per PCP. Is now on metformin for PCOS. Patient states that she felt like she was going to pass out yesterday.   • Pacemaker Check     Last SJM PPM check was done on 06/06/19 per our office.    • Med Refill     Needs refills on Northera to [facility]. Brought medication list with visit.        Cardiac Complaints  dyspnea, Dizziness and palpitations      Subjective   Kathy Peña is a 44 y.o. female with PTSD, depression,SSS, POTS, and history of seizures secondary to head trauma at age [age]. She saw Dr. Conde for her initial consult on 5/3/18 for palpitations and syncope.  Holter monitor at [facility] prior to consult showed sinus melissa with no significant pause or arrhythmia and repeated 4 day monitor here was essentially the same. She did not have syncope while wearing monitor in May. Nuclear stress showed no ischemia and normal LV function. She was referred for tilt table test which was positive for neurocardiogenic syncope. She was started on midodrine 5 mg TID. It appears northera was added for better blood pressure control TID. Then extended monitor was advised as she continued to report syncopal episodes in June and it showed several episodes of marked bradycardia (lowest 31), (2) 2 second pauses, and idioventricular run with rate of 43, and it appears patient was then referred to Dr. Coulter for [facility] PPM placement for SSS.   Our office was notified for high rate VT per Merlin on 8/12/2018 and patient was advised to come in for interrogation.  Investigation done on 8/13/2018 showed no VT but NST and pacer setting changes were made.  She returned in May 2019 with newly diagnosed POTS for which she takes midodrine and northera. Most recent SJM pacer check in June 2019 showed  normal function, 10 years of battery life, and less than 1%AMS.    Patient returns today for follow up and reports continued pre-syncopal episodes. With these episodes, patient admits she becomes weak and feels like she is going to black out. Her blood pressure is usually low or unable to be read on her cuff. She reports drinking a gatorade and this is improved. She does report some issues with her heart racing but notes it is not often and quickly is improved, most of the time it depends on what she is doing, or if her blood pressure is low. She states more shortness of breath recently but admits she has gained a significant amount of weight recently which she attributes to female hormone imbalance. She is now taking metformin therapy for PCOS management. Labs she reports remain followed by PCP, and were checked most recently last month, no current available for review. Refills of northera requested.            Cardiac History  Past Surgical History:   Procedure Laterality Date   • CARDIOVASCULAR STRESS TEST  05/08/2018    (Mod) 7 Min, 27 Secs. 7.7 METS. 76% THR. BP- 161/79. Breast Attenuation.   • CONVERTED (HISTORICAL) HOLTER  04/25/2018    @[facility]. AVG HR- 57 BPM. Minimum- 38 BPM   • CONVERTED (HISTORICAL) HOLTER  05/08/2018    4 day- avg 61 bpm, multiple sinus melissa, 6 PVC, 1 PAC   • CONVERTED (HISTORICAL) HOLTER  07/05/2018    AVG HR 48 BPM. Min 31 BPM. 2 pauses W/O symptoms.One ideoventricular run at 43 BPM   • ECHO - CONVERTED  05/08/2018    EF 65%. RVSP- 18 mmHg   • OTHER SURGICAL HISTORY  05/23/2018    Tilt Table Test- positive for neurocardiogenic syncope        Current Outpatient Medications   Medication Sig Dispense Refill   • albuterol sulfate  (90 Base) MCG/ACT inhaler Inhale 2 puffs Every 4 (Four) Hours As Needed for Wheezing.     • aspirin 81 MG EC tablet Take 81 mg by mouth Daily.     • cetirizine (zyrTEC) 10 MG tablet Take 10 mg by mouth Daily.     • clonazePAM (KlonoPIN) 1 MG tablet Take 1 mg  by mouth At Night As Needed.     • dicyclomine (BENTYL) 10 MG capsule Take 10 mg by mouth 3 (Three) Times a Day As Needed.     • Droxidopa (NORTHERA) 100 MG capsule Take 600 mg by mouth 3 (Three) Times a Day. 540 capsule 3   • estradiol (ESTRACE) 1 MG tablet Take 1 mg by mouth Daily.     • fluconazole (DIFLUCAN) 150 MG tablet Take 150 mg by mouth 1 (One) Time.     • Fremanezumab-vfrm (AJOVY) 225 MG/1.5ML solution prefilled syringe Inject  under the skin into the appropriate area as directed.     • gabapentin (NEURONTIN) 300 MG capsule Take 300 mg by mouth 3 (Three) Times a Day.     • medroxyPROGESTERone (PROVERA) 2.5 MG tablet Take 2.5 mg by mouth Daily.     • metFORMIN (GLUCOPHAGE) 500 MG tablet Take 500 mg by mouth 2 (Two) Times a Day With Meals.     • metoprolol tartrate (LOPRESSOR) 25 MG tablet Take 1 tablet by mouth 2 (Two) Times a Day. 180 tablet 2   • omeprazole (priLOSEC) 20 MG capsule Take 20 mg by mouth Daily.     • sertraline (ZOLOFT) 100 MG tablet 2 tabs daily     • topiramate (TOPAMAX) 50 MG tablet Take 50 mg by mouth 2 (Two) Times a Day.       No current facility-administered medications for this visit.        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)    • History of colon resection    • Hx of cholecystectomy    • Hx of tubal ligation    • Hypotension    • Low back pain    • Moderate depressive disorder    • Osteoarthritis    • Palpitations    • PTSD (post-traumatic stress disorder)    • Stroke (CMS/HCC)    • Vitamin D deficiency        Social History     Socioeconomic History   • Marital status:      Spouse name: Not on file   • Number of children: Not on file   • Years of education: Not on file   • Highest education level: Not on file   Tobacco Use   • Smoking status: Former Smoker     Last  "attempt to quit: 5/3/1997     Years since quittin.7   • Smokeless tobacco: Never Used   Substance and Sexual Activity   • Alcohol use: No   • Drug use: No       Family History   Problem Relation Age of Onset   • Heart disease Mother    • Clotting disorder Mother    • Hypertension Mother    • Hyperlipidemia Mother    • Asthma Mother    • Anemia Mother    • Heart disease Sister    • Hypertension Sister    • Heart attack Maternal Grandmother    • Heart attack Maternal Grandfather    • Heart attack Paternal Grandmother    • Heart attack Paternal Grandfather        Review of Systems   Constitution: Positive for weight gain. Negative for malaise/fatigue and night sweats.   Cardiovascular: Positive for dyspnea on exertion, near-syncope and palpitations. Negative for chest pain, claudication, irregular heartbeat, leg swelling, orthopnea and syncope.   Respiratory: Positive for shortness of breath. Negative for cough and wheezing.    Musculoskeletal: Positive for stiffness. Negative for back pain and joint pain.   Gastrointestinal: Negative for anorexia, heartburn, nausea and vomiting.   Genitourinary: Negative for dysuria, hesitancy and nocturia.   Neurological: Positive for dizziness. Negative for light-headedness and loss of balance.   Psychiatric/Behavioral: Positive for depression. The patient is nervous/anxious.            Objective     BP 90/60 (BP Location: Left arm)   Pulse 66   Ht 152.4 cm (60\")   Wt 92.1 kg (203 lb)   BMI 39.65 kg/m²     Physical Exam   Constitutional: She is oriented to person, place, and time. She appears well-developed and well-nourished.   HENT:   Head: Normocephalic and atraumatic.   Eyes: Pupils are equal, round, and reactive to light. EOM are normal.   Neck: Normal range of motion. Neck supple.   Cardiovascular: Normal rate and regular rhythm.   Murmur heard.  Pulmonary/Chest: Effort normal and breath sounds normal.   Abdominal: Soft.   Musculoskeletal: Normal range of motion.   "   Neurological: She is alert and oriented to person, place, and time.   Skin: Skin is warm and dry.   Psychiatric: She has a normal mood and affect. Her behavior is normal.         ECG 12 Lead  Date/Time: 1/29/2020 1:12 PM  Performed by: Delmy Silva APRN  Authorized by: Delmy Silva APRN   Comparison: compared with previous ECG from 5/15/2019  Similar to previous ECG  Rhythm: paced  BPM: 66    Clinical impression: abnormal EKG  Comments: Normal QT            Assessment/Plan     Hypotension:  Managed with northera therapy.  Patient continues to take 600mg TID in [facility]. Most often blood pressure is at goal.  If it is running lower, patient drinks extra gatorade to improve it, which is beneficial.     POTS: Continues with both northera as well as BB therapy for tachycardia management. Heart races sometimes but not often. For now, current dosing will be continued of metoprolol. If palpitations should worsen or tachycardia noted on pacer check increase in dosing advised.  She still remains followed by neurology for POTS management as well.    SSS:  Repeat [facility] pacer check will be advised. EKG done today in [facility] shows an atrial paced and ventricular sensed rhythm. QT is normal.    PCOS:  Managed by PCP.  She is now on metformin in [facility]. Almost 20 pound weight gain noted from prior visit.  Patient reports all female hormones have been abnormal.     Anxiety:  Managed with klonopin therapy.      No seizure activity reported. Patient following with neuro in regards.    BMI noted at 39.65, she admits to a good diet with limited caloric intake and carbs. Patient also reports walking regularly which she was urged to continue.  She will follow closely with your office in regards to weight management.    NO repeat cardiac workup recommended at this time as no new concerns are voiced or worsening symptoms. She was urged to call with problems.    Refills sent per request.    6 month follow up recommended or sooner  if needed.          Problems Addressed this Visit        Cardiovascular and Mediastinum    Murmur, cardiac    Palpitations    SSS (sick sinus syndrome) (CMS/HCC) - Primary    Relevant Medications    Droxidopa (NORTHERA) 100 MG capsule    Other Relevant Orders    ECG 12 Lead    Neurogenic orthostatic hypotension (CMS/HCC)       Respiratory    Shortness of breath       Digestive    Severe obesity (BMI 35.0-39.9) with comorbidity (CMS/HCC)       Nervous and Auditory    Seizure (CMS/HCC)      Other Visit Diagnoses     POTS (postural orthostatic tachycardia syndrome)        PTSD (post-traumatic stress disorder)        Weight gain              Patient's Body mass index is 39.65 kg/m². BMI is above normal parameters. Recommendations include: nutrition counseling.            Electronically signed by ROSE Espinal January 29, 2020 2:51 PM

## 2020-01-29 ENCOUNTER — OFFICE VISIT (OUTPATIENT)
Dept: CARDIOLOGY | Facility: CLINIC | Age: 45
End: 2020-01-29

## 2020-01-29 VITALS
BODY MASS INDEX: 39.85 KG/M2 | HEART RATE: 66 BPM | WEIGHT: 203 LBS | SYSTOLIC BLOOD PRESSURE: 90 MMHG | HEIGHT: 60 IN | DIASTOLIC BLOOD PRESSURE: 60 MMHG

## 2020-01-29 DIAGNOSIS — R63.5 WEIGHT GAIN: ICD-10-CM

## 2020-01-29 DIAGNOSIS — R01.1 MURMUR, CARDIAC: ICD-10-CM

## 2020-01-29 DIAGNOSIS — R06.02 SHORTNESS OF BREATH: ICD-10-CM

## 2020-01-29 DIAGNOSIS — G90.A POTS (POSTURAL ORTHOSTATIC TACHYCARDIA SYNDROME): ICD-10-CM

## 2020-01-29 DIAGNOSIS — F43.10 PTSD (POST-TRAUMATIC STRESS DISORDER): ICD-10-CM

## 2020-01-29 DIAGNOSIS — I49.5 SSS (SICK SINUS SYNDROME) (HCC): Primary | ICD-10-CM

## 2020-01-29 DIAGNOSIS — R00.2 PALPITATIONS: ICD-10-CM

## 2020-01-29 DIAGNOSIS — G90.3 NEUROGENIC ORTHOSTATIC HYPOTENSION (HCC): ICD-10-CM

## 2020-01-29 DIAGNOSIS — E66.01 SEVERE OBESITY (BMI 35.0-39.9) WITH COMORBIDITY (HCC): ICD-10-CM

## 2020-01-29 DIAGNOSIS — R56.9 SEIZURE (HCC): ICD-10-CM

## 2020-01-29 PROCEDURE — 99213 OFFICE O/P EST LOW 20 MIN: CPT | Performed by: NURSE PRACTITIONER

## 2020-01-29 PROCEDURE — 93000 ELECTROCARDIOGRAM COMPLETE: CPT | Performed by: NURSE PRACTITIONER

## 2020-01-29 RX ORDER — ASPIRIN 81 MG/1
81 TABLET ORAL DAILY
COMMUNITY
End: 2020-08-28

## 2020-01-29 RX ORDER — FLUCONAZOLE 150 MG/1
150 TABLET ORAL ONCE
COMMUNITY
End: 2020-07-15 | Stop reason: ALTCHOICE

## 2020-01-29 RX ORDER — DROXIDOPA 100 MG/1
600 CAPSULE ORAL 3 TIMES DAILY
Qty: 540 CAPSULE | Refills: 3 | Status: SHIPPED | OUTPATIENT
Start: 2020-01-29 | End: 2020-05-28 | Stop reason: SDUPTHER

## 2020-01-29 RX ORDER — MEDROXYPROGESTERONE ACETATE 2.5 MG/1
2.5 TABLET ORAL DAILY
COMMUNITY
End: 2020-07-15 | Stop reason: ALTCHOICE

## 2020-02-05 ENCOUNTER — OFFICE VISIT (OUTPATIENT)
Dept: CARDIOLOGY | Facility: CLINIC | Age: 45
End: 2020-02-05

## 2020-02-05 DIAGNOSIS — I49.5 SSS (SICK SINUS SYNDROME) (HCC): Primary | ICD-10-CM

## 2020-02-05 DIAGNOSIS — R00.2 PALPITATIONS: ICD-10-CM

## 2020-02-05 PROCEDURE — 93280 PM DEVICE PROGR EVAL DUAL: CPT | Performed by: INTERNAL MEDICINE

## 2020-05-14 ENCOUNTER — TELEPHONE (OUTPATIENT)
Dept: CARDIOLOGY | Facility: CLINIC | Age: 45
End: 2020-05-14

## 2020-05-14 NOTE — TELEPHONE ENCOUNTER
Received request for cardiac clearance from Dr Yaniv Hernandez for Total Abdominal hysterectomy, and bilateral salpingo-oopherectomy. According to chart she has no stents, has [facility] PPM with last interrogation on 2/5/2020. She does take aspirin 81mg daily.

## 2020-05-28 RX ORDER — DROXIDOPA 100 MG/1
600 CAPSULE ORAL 3 TIMES DAILY
Qty: 540 CAPSULE | Refills: 2 | Status: SHIPPED | OUTPATIENT
Start: 2020-05-28 | End: 2020-07-15 | Stop reason: ALTCHOICE

## 2020-07-15 ENCOUNTER — OFFICE VISIT (OUTPATIENT)
Dept: CARDIOLOGY | Facility: CLINIC | Age: 45
End: 2020-07-15

## 2020-07-15 VITALS
SYSTOLIC BLOOD PRESSURE: 102 MMHG | BODY MASS INDEX: 44.65 KG/M2 | TEMPERATURE: 98 F | HEART RATE: 66 BPM | HEIGHT: 60 IN | WEIGHT: 227.4 LBS

## 2020-07-15 DIAGNOSIS — I49.5 SSS (SICK SINUS SYNDROME) (HCC): ICD-10-CM

## 2020-07-15 DIAGNOSIS — R42 DIZZINESS: ICD-10-CM

## 2020-07-15 DIAGNOSIS — G90.3 NEUROGENIC ORTHOSTATIC HYPOTENSION (HCC): ICD-10-CM

## 2020-07-15 DIAGNOSIS — R55 SYNCOPE, UNSPECIFIED SYNCOPE TYPE: Primary | ICD-10-CM

## 2020-07-15 DIAGNOSIS — R00.2 PALPITATIONS: ICD-10-CM

## 2020-07-15 DIAGNOSIS — R06.02 SHORTNESS OF BREATH: ICD-10-CM

## 2020-07-15 PROCEDURE — 99214 OFFICE O/P EST MOD 30 MIN: CPT | Performed by: NURSE PRACTITIONER

## 2020-07-15 PROCEDURE — 93000 ELECTROCARDIOGRAM COMPLETE: CPT | Performed by: NURSE PRACTITIONER

## 2020-07-15 RX ORDER — ONDANSETRON 4 MG/1
4 TABLET, FILM COATED ORAL 2 TIMES DAILY PRN
COMMUNITY
End: 2022-07-14

## 2020-07-15 RX ORDER — LORAZEPAM 1 MG/1
1 TABLET ORAL 2 TIMES DAILY
COMMUNITY
End: 2021-01-11 | Stop reason: ALTCHOICE

## 2020-07-15 RX ORDER — DROXIDOPA 300 MG/1
600 CAPSULE ORAL 3 TIMES DAILY
Qty: 540 CAPSULE | Refills: 3 | Status: SHIPPED | OUTPATIENT
Start: 2020-07-15 | End: 2021-07-12

## 2020-07-15 RX ORDER — ARIPIPRAZOLE 2 MG/1
5 TABLET ORAL DAILY
COMMUNITY
End: 2022-07-14 | Stop reason: ALTCHOICE

## 2020-07-15 RX ORDER — DROXIDOPA 300 MG/1
600 CAPSULE ORAL 3 TIMES DAILY
COMMUNITY
End: 2020-07-15 | Stop reason: SDUPTHER

## 2020-07-15 RX ORDER — BACLOFEN 10 MG/1
10 TABLET ORAL 3 TIMES DAILY
COMMUNITY
End: 2021-07-12

## 2020-07-15 RX ORDER — TOPIRAMATE 25 MG/1
100 TABLET ORAL 2 TIMES DAILY
COMMUNITY
End: 2021-01-11 | Stop reason: ALTCHOICE

## 2020-07-15 NOTE — PROGRESS NOTES
"Chief Complaint   Patient presents with   • Follow-up     Cardiac management.   • Lab     Last labs 2 weeks ago per PCP. Had overnight pulse ox, to get results today.   • Pacemaker Check     Last [employer] PPM interrogation 2/5/20.   • Chest Pain     Having heaviness in chest and dull pains, having nausea with pains and shortness of breath. Having throbbing headaches.   • Rapid Heart Rate     Having racing heart rate for the last 1 to 1 1/2 months, has noticed on fitbit  yesterday.   • Shortness of Breath     Having with exertion and at rest, she states \"I don't have enough CO2\".   • Med Refill     Needs refills on Northera-90 day.     Subjective       Kathy Peña is a 44 y.o. female with PTSD, depression, SSS, POTS, and history of seizures secondary to head trauma at age [age]. She saw Dr. Conde for her initial consult on 5/3/18 for palpitations and syncope.  Holter monitor at [facility] prior to consult showed sinus melissa with no significant pause or arrhythmia and repeated 4 day monitor here essentially the same. She did not have syncope while wearing monitor in May. Nuclear stress showed no ischemia and normal LV function.  Then extended monitor was advised as she continued to report syncopal episodes in June and it showed several episodes of marked bradycardia (lowest 31), (2) 2 second pauses, and idioventricular run with rate of 43. She was  then referred to Dr. Coulter for SJM PPM placement for SSS.  Our office was notified for high rate VT per Merlin on 8/12/2018. She had interrogation on 8/13/2018 showed no VT but NST and pacer setting changes were made. She underwent TTT which was positive for neurocardiogenic syncope, diagnosed with POTS, managed with midodrine and Northera.  Device interrogation on 2/5/2020 showed 97% RA paced, 3% RV paced with 1 high ventricular rate and mode switch noted.  Changes made to increase PVARP to 300 ms.    She came today for follow-up.  She continues to have " intermittent dizziness and lightheadedness she associates with tachycardia and heart racing.  She wears Fitbit which notifies her heart rate often 120-130 at rest.  She claims yesterday heart rate 126 and /90 at rest.  She felt heavy in her chest.  A few minutes later, symptoms subsided and heart rate returned to normal and BP was undetectable on her monitor.  She denies syncope but has episodes of presyncope especially with changing positions.  She continues to take Northera but has stopped Midodrine, unsure why.  She drinks lots of fluid/water and has good urine output but feels she is swollen all the time.  Labs checked yesterday with PCP with plans to follow-up later today for results.  Overnight oximetry also done by PCP with results pending.  She wants to know why we did not notify her as before when she had high heart rate.  She underwent total abdominal hysterectomy in May without complications.  She has gained 24 pounds in 6 months.         Cardiac History:    Past Surgical History:   Procedure Laterality Date   • CARDIAC PACEMAKER PLACEMENT  07/16/2018    Dr. Lima [facility] dual-chamber ppm   • CARDIOVASCULAR STRESS TEST  05/08/2018    (Mod) 7 Min, 27 Secs. 7.7 METS. 76% THR. BP- 161/79. Breast Attenuation.   • CONVERTED (HISTORICAL) HOLTER  04/25/2018    @[facility]. AVG HR- 57 BPM. Minimum- 38 BPM   • CONVERTED (HISTORICAL) HOLTER  05/08/2018    4 day- avg 61 bpm, multiple sinus melissa, 6 PVC, 1 PAC   • CONVERTED (HISTORICAL) HOLTER  07/05/2018    AVG HR 48 BPM. Min 31 BPM. 2 pauses W/O symptoms.One ideoventricular run at 43 BPM   • ECHO - CONVERTED  05/08/2018    EF 65%. RVSP- 18 mmHg   • OTHER SURGICAL HISTORY  05/23/2018    Tilt Table Test- positive for neurocardiogenic syncope      Current Outpatient Medications   Medication Sig Dispense Refill   • albuterol sulfate  (90 Base) MCG/ACT inhaler Inhale 2 puffs Every 4 (Four) Hours As Needed for Wheezing.     • ARIPiprazole (ABILIFY) 2 MG tablet Take  2 mg by mouth Daily.     • aspirin 81 MG EC tablet Take 81 mg by mouth Daily.     • baclofen (LIORESAL) 10 MG tablet Take 10 mg by mouth 3 (Three) Times a Day.     • cetirizine (zyrTEC) 10 MG tablet Take 10 mg by mouth Daily.     • dicyclomine (BENTYL) 10 MG capsule Take 10 mg by mouth 3 (Three) Times a Day As Needed.     • droxidopa (Northera) 300 MG capsule capsule Take 2 capsules by mouth 3 (Three) Times a Day. 540 capsule 3   • Fremanezumab-vfrm (AJOVY) 225 MG/1.5ML solution prefilled syringe Inject  under the skin into the appropriate area as directed.     • LORazepam (ATIVAN) 1 MG tablet Take 1 mg by mouth 2 (two) times a day.     • metoprolol tartrate (LOPRESSOR) 25 MG tablet Take 1 tablet by mouth 2 (Two) Times a Day. 180 tablet 3   • omeprazole (priLOSEC) 20 MG capsule Take 20 mg by mouth Daily.     • ondansetron (ZOFRAN) 4 MG tablet Take 4 mg by mouth 2 (Two) Times a Day As Needed for Nausea or Vomiting.     • sertraline (ZOLOFT) 100 MG tablet 2 tabs daily     • topiramate (TOPAMAX) 25 MG tablet Take 25 mg by mouth 3 (Three) Times a Day.       No current facility-administered medications for this visit.      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)    • History of colon resection    • Hx of cholecystectomy    • Hx of tubal ligation    • Hypotension    • Low back pain    • Moderate depressive disorder    • Osteoarthritis    • Palpitations    • PTSD (post-traumatic stress disorder)    • Stroke (CMS/HCC)    • Vitamin D deficiency      Social History     Socioeconomic History   • Marital status:      Spouse name: Not on file   • Number of children: Not on file   • Years of education: Not on file   • Highest education level: Not on file   Tobacco Use   • Smoking status:  "Former Smoker     Last attempt to quit: 5/3/1997     Years since quittin.2   • Smokeless tobacco: Never Used   Substance and Sexual Activity   • Alcohol use: No   • Drug use: No     Family History   Problem Relation Age of Onset   • Heart disease Mother    • Clotting disorder Mother    • Hypertension Mother    • Hyperlipidemia Mother    • Asthma Mother    • Anemia Mother    • Heart disease Sister    • Hypertension Sister    • Heart attack Maternal Grandmother    • Heart attack Maternal Grandfather    • Heart attack Paternal Grandmother    • Heart attack Paternal Grandfather      Review of Systems   Constitution: Positive for malaise/fatigue and weight gain (Increased 24 pounds in 6 months). Negative for decreased appetite.   HENT: Negative.    Eyes: Negative.    Cardiovascular: Positive for dyspnea on exertion, leg swelling, near-syncope and palpitations. Negative for chest pain (Occasional heaviness) and syncope.   Respiratory: Positive for shortness of breath. Negative for cough.    Endocrine: Negative.    Hematologic/Lymphatic: Negative.    Skin: Negative.    Musculoskeletal: Negative for falls and myalgias.   Gastrointestinal: Negative for abdominal pain and melena.   Genitourinary: Negative for dysuria and hematuria.   Neurological: Positive for dizziness and light-headedness.   Psychiatric/Behavioral: Positive for depression (By history). Negative for altered mental status. The patient is nervous/anxious (By history).    Allergic/Immunologic: Negative.       Diabetes- No  Thyroid-normal    Objective     BP (!) 102/0   Pulse 66   Temp 98 °F (36.7 °C)   Ht 152.4 cm (60\")   Wt 103 kg (227 lb 6.4 oz)   BMI 44.41 kg/m²     Physical Exam   Constitutional: She is oriented to person, place, and time. She appears well-developed and well-nourished. No distress.   HENT:   Head: Normocephalic.   Eyes: Pupils are equal, round, and reactive to light.   Neck: Normal range of motion.   Cardiovascular: Normal rate, " regular rhythm and intact distal pulses.   Pulmonary/Chest: Effort normal and breath sounds normal.   Abdominal: Soft. Bowel sounds are normal.   Musculoskeletal: Normal range of motion. She exhibits edema (No pitting edema noted, appears to have lipidema).   Neurological: She is alert and oriented to person, place, and time.   Skin: Skin is warm and dry. She is not diaphoretic.   Psychiatric: She has a normal mood and affect. Her behavior is normal.   Nursing note and vitals reviewed.       ECG 12 Lead  Date/Time: 7/15/2020 10:21 AM  Performed by: Tiesha Ragland APRN  Authorized by: Tiesha Ragland APRN   Comparison: compared with previous ECG from 1/29/2020  Comparison to previous ECG: Sinus rhythm today, atrial pacing noted on previous EKG  Rhythm: sinus rhythm  Rate: normal  BPM: 66  ST Segments: ST segments normal    Clinical impression: non-specific ECG  Comments:  ms  QTc 391 ms                Assessment/Plan      Kathy was seen today for follow-up, lab, pacemaker check, chest pain, rapid heart rate, shortness of breath and med refill.    Diagnoses and all orders for this visit:    Syncope, unspecified syncope type  -     ECG 12 Lead    Neurogenic orthostatic hypotension (CMS/HCC)  -     ECG 12 Lead    SSS (sick sinus syndrome) (CMS/HCC)  -     Ambulatory Referral to Cardiac Electrophysiology  -     ECG 12 Lead    Palpitations  -     Ambulatory Referral to Cardiac Electrophysiology    Shortness of breath    Dizziness  -     Ambulatory Referral to Cardiac Electrophysiology    Other orders  -     droxidopa (Northera) 300 MG capsule capsule; Take 2 capsules by mouth 3 (Three) Times a Day.    1.  Syncope-difficult to auscultate BP, systolic 102 unable to hear diastolic.  Neurocardiogenic orthostatic hypotension with positive TTT.  Continue Northera at maximum dose.  She has had no recurrent syncope since her last visit.  Encouraged to push fluids, she may benefit with Gatorade or electrolyte water.  I did  not restart Midodrine at this time.    2.  SSS- s/p SJM ppm, we will schedule her for device interrogation.  We discussed remote monitoring with EP which she would like a referral.  She has been noted to have high rate with RVR on previous interrogations.  She is on metoprolol 25 mg twice daily.  I did not increase the beta-blocker secondary to hypotension.  EP may have recommendations.    3.  Weight gain/edema- she is following with PCP for labs.  Consider 24-hour urine for protein, rule out nephrotic syndrome.  She does report elevated cholesterol.  Could we get a copy of her most recent labs?    4.  Shortness of breath- agree with sleep apnea work-up.  Echocardiogram in 2018 showed normal LV function, normal PA pressure.  With worsening symptoms of shortness of breath, weight gain and fatigue may need to consider repeating her echocardiogram if her lab and pulse ox work-up is negative.    Further recommendations to follow.  We will see her back in 6 months for follow-up.  Device interrogation will be scheduled.    Patient's Body mass index is 44.41 kg/m². BMI is above normal parameters. Recommendations include: nutrition counseling.               Electronically signed by ROSE Cope,  July 15, 2020 10:34

## 2020-08-05 ENCOUNTER — OFFICE VISIT (OUTPATIENT)
Dept: CARDIOLOGY | Facility: CLINIC | Age: 45
End: 2020-08-05

## 2020-08-05 DIAGNOSIS — I49.5 SSS (SICK SINUS SYNDROME) (HCC): Primary | ICD-10-CM

## 2020-08-05 DIAGNOSIS — R00.2 PALPITATIONS: ICD-10-CM

## 2020-08-05 PROCEDURE — 93280 PM DEVICE PROGR EVAL DUAL: CPT | Performed by: INTERNAL MEDICINE

## 2020-08-27 NOTE — PROGRESS NOTES
Cardiac Electrophysiology Outpatient Consult Note            [facility]    Consult Note     Kathy Peña  [id number]  08/28/2020       Primary Care Physician: Anson Ovalle MD    Referred By: Tiesha Ragland APRN    Subjective     Chief Complaint:   Chief Complaint   Patient presents with   • Irregular Heart Beat   • Shortness of Breath     Problem List:      1. Sick Sinus Syndrome  a. Documented history of sinus node dysfunction with bradycardia and sinus pauses.  b. SJM DDD PM implant 7/16/2018 by Dr Coulter in [place], [place]  c. Echo 5/8/2018 EF 65%, mild LVH, no significant VHD, LA 3.1 cm  2. Neurocardiogenic syncope  3. Anxiety  / Depression  4. PTSD  5. Seizures      History of Present Illness: Ms. Kathy Peña is a 44-year-old white female seen in EP consultation today for evaluation of ongoing episodes of intermittent palpitations, dizziness and lightheadedness associated with tachycardia.  Patient noted to have [employer] dual-chamber pacemaker implanted in 2018 at outlying facility per Dr. Coulter for sinus node dysfunction and documented bradycardia with long pauses.  Device interrogation on 2/5/2020 showed 97% RA paced, 3% RV paced with 1 high ventricular rate and mode switch noted and changes made to increase PVARP to 300 ms.  From presentation today patient reports daily episodes of sudden onset tachypalpitations associated with dizziness that can last up to 10 minutes in duration.  Patient states the episodes occur at random and are not associated with any activity that she can associated with.  She reports that they occur at rest as well as with activity.  Patient admits to associated symptoms of presyncope but denies episodes of syncope.  Patient has noted history of neurocardiogenic syncope but states that these episodes are much different.  Patient states she stays well-hydrated with water and Gatorade and treatment for her neurocardiogenic  syncope which she has had for multiple years now.  Patient admits to symptoms of fatigue and dyspnea on exertion but denies orthopnea, PND, or increased bilateral lower extremity edema.  Patient noted with echocardiogram in 2018 with normal LV function and without structural abnormalities noted.  Patient admits to concerning symptoms for undiagnosed obstructive sleep apnea with poor sleep habits, daily headaches, and awakening herself snoring regularly throughout the night.  Patient denies history of cardiac arrhythmia      Review of Systems:   Constitutional: No fevers or chills, no recent weight gain or weight loss, + fatigue  Eyes: No visual loss, blurred vision, double vision, yellow sclerae.  ENT: No headaches, hearing loss, vertigo, congestion or sore throat.   Cardiovascular: Per HPI  Respiratory: No cough or wheezing, no sputum production, no hematemesis, + SOA, STINSON  Gastrointestinal: No abdominal pain, no nausea, vomiting, constipation, diarrhea, melena.   Genitourinary: No dysuria, hematuria or increased frequency.  Musculoskeletal:  No gait disturbance, weakness or joint pain or stiffness  Integumentary: No rashes, urticaria, ulcers or sores.   Neurological: No headache, dizziness, syncope, paralysis, ataxia, no prior CVA/TIA  Psychiatric: No anxiety, or depression  Endocrine: No diaphoresis, cold or heat intolerance. No polyuria or polydipsia.   Hematologic/Lymphatic: No anemia, abnormal bruising or bleeding. No history of DVT/PE.        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 Coulter- [facility] dual-chamber ppm   • CARDIOVASCULAR STRESS TEST  2018    (Mod) 7 Min, 27 Secs. 7.7 METS. 76% THR. BP- 161/79. Breast Attenuation.   • CONVERTED (HISTORICAL) HOLTER  2018    @[facility]. AVG HR- 57 BPM. Minimum- 38 BPM   • CONVERTED (HISTORICAL) HOLTER  2018    4 day- avg 61 bpm, multiple sinus melissa, 6 PVC, 1 PAC   • CONVERTED (HISTORICAL) HOLTER  2018    AVG HR 48 BPM. Min 31 BPM. 2 pauses W/O symptoms.One ideoventricular run at 43 BPM   • ECHO - CONVERTED  2018    EF 65%. RVSP- 18 mmHg   • OTHER SURGICAL HISTORY  2018    Tilt Table Test- positive for neurocardiogenic syncope        Family History:   Family History   Problem Relation Age of Onset   • Heart disease Mother    • Clotting disorder Mother    • Hypertension Mother    • Hyperlipidemia Mother    • Asthma Mother    • Anemia Mother    • Heart disease Sister    • Hypertension Sister    • Heart attack Maternal Grandmother    • Heart attack Maternal Grandfather    • Heart attack Paternal Grandmother    • Heart attack Paternal Grandfather        Social History:   Social History     Socioeconomic History   • Marital status:      Spouse name: Not on file   • Number of children: Not on file   • Years of education: Not on file   • Highest education level: Not on file   Tobacco Use   • Smoking status: Former Smoker     Last attempt to quit: 5/3/1997     Years since quittin.3   • Smokeless tobacco: Never Used   Substance and Sexual Activity   • Alcohol use: No   • Drug use: No       Medications:     Current Outpatient Medications:   •  albuterol sulfate  (90 Base) MCG/ACT inhaler, Inhale 2 puffs Every 4 (Four) Hours As Needed for Wheezing., Disp: , Rfl:   •  ARIPiprazole (ABILIFY) 2 MG tablet,  "Take 2 mg by mouth Daily., Disp: , Rfl:   •  baclofen (LIORESAL) 10 MG tablet, Take 10 mg by mouth 3 (Three) Times a Day., Disp: , Rfl:   •  cetirizine (zyrTEC) 10 MG tablet, Take 10 mg by mouth Daily., Disp: , Rfl:   •  droxidopa (Northera) 300 MG capsule capsule, Take 2 capsules by mouth 3 (Three) Times a Day., Disp: 540 capsule, Rfl: 3  •  estradiol (CLIMARA) 0.1 MG/24HR patch, APPLY NEW PATCH WEEKLY, Disp: , Rfl:   •  hydrOXYzine pamoate (VISTARIL) 25 MG capsule, Take 25 mg by mouth 4 (Four) Times a Day As Needed., Disp: , Rfl:   •  LORazepam (ATIVAN) 1 MG tablet, Take 1 mg by mouth 2 (two) times a day., Disp: , Rfl:   •  metoprolol tartrate (LOPRESSOR) 25 MG tablet, Take 1 tablet by mouth 2 (Two) Times a Day., Disp: 180 tablet, Rfl: 3  •  omeprazole (priLOSEC) 20 MG capsule, Take 20 mg by mouth Daily., Disp: , Rfl:   •  ondansetron (ZOFRAN) 4 MG tablet, Take 4 mg by mouth 2 (Two) Times a Day As Needed for Nausea or Vomiting., Disp: , Rfl:   •  rosuvastatin (CRESTOR) 10 MG tablet, Take 10 mg by mouth Daily., Disp: , Rfl:   •  sertraline (ZOLOFT) 100 MG tablet, 2 tabs daily, Disp: , Rfl:   •  topiramate (TOPAMAX) 25 MG tablet, Take 100 mg by mouth 2 (Two) Times a Day., Disp: , Rfl:     Allergies:   Allergies   Allergen Reactions   • Latex Other (See Comments)     Blistering and peeling of skin.   • Demerol [Meperidine] Hives     Shaking/jerking   • Other Other (See Comments)     Medication to increase heart rate-she reports having difficulty breathing and tachycardia       Objective     Physical Exam:  Vital Signs:   Vitals:    08/28/20 1211   BP: 104/76   BP Location: Right arm   Patient Position: Sitting   Pulse: 61   Temp: 96.9 °F (36.1 °C)   Weight: 102 kg (225 lb)   Height: 152.4 cm (60\")       GEN: Well nourished, well-developed, no acute distress  HEENT: Normocephalic, atraumatic, moist mucous membranes  NECK: Supple, no JVD, no thyromegaly, no lymphadenopathy  CARD: Regular rate and rhythm, normal S1 & S2 " are present.  No murmur, gallop or rubs are appreciated.  LUNGS: Clear to auscultation bilateraly, normal respiratory effort  ABDOMEN: Soft, nontender, normal bowel sounds  EXTREMITIES: No gross deformities, no clubbing, cyanosis.  No Edema   SKIN: Warm, dry  NEURO: No focal deficits, alert and oriented x 3  PSYCHIATRIC: Normal affect and mood, appropriate use of semantics and logic.        Cardiac Testing:      I personally viewed and interpreted the patient's EKG/Telemetry/lab data      ECG 12 Lead  Date/Time: 8/28/2020 12:58 PM  Performed by: Steven Franklin APRN  Authorized by: Steven Franklin APRN   Comparison: compared with previous ECG from 7/15/2020  Similar to previous ECG  Rhythm: sinus rhythm  BPM: 60          Device interrogation: [employer] dual-chamber permanent pacemaker at DDDR- 60/105 bpm, RA paced 97%, RV paced 1.1%, acceptable lead threshold and impedance, no events detected, home monitoring system in place.      Assessment & Plan        Kathy was seen today for irregular heart beat and shortness of breath.    Diagnoses and all orders for this visit:    Palpitations    SSS (sick sinus syndrome) (CMS/HCC)    Neurogenic orthostatic hypotension (CMS/HCC)    Severe obesity (BMI 35.0-39.9) with comorbidity (CMS/HCC)    Snoring    Dizziness    Other orders  -     ECG 12 Lead      Plan: Ms. Kathy Peña is a 44-year-old white female seen in EP consultation today for evaluation of ongoing intermittent palpitations associated with tachycardia and dizziness.  Interrogation of patient's pacemaker today reveals normal function without arrhythmias to correlate with patient's symptoms of intermittent palpitations.  Patient however does have symptoms concerning for undiagnosed obstructive sleep apnea with poor sleep habits, daily headaches, and awakening herself snoring regularly throughout the night.  Would recommend sleep medicine referral for further evaluation.  Patient prefers to stay  [facility] for her consultation.  Will arrange for sleep medicine referral in terms and to follow-up with patient on as-needed basis.  Patient carries to continue follow-up with Dr. Conde's office.    Follow Up: As needed      Scribed for Jordin Ramirez DO by Steven Franklin, ROSE . 8/28/2020  13:03        Thank you for allowing me to participate in the care of your patient. Please to not hesitate to contact me with additional questions or concerns.        Jordin Ramirez DO, FACC, [facility], [place]  Cardiac Electrophysiologist

## 2020-08-28 ENCOUNTER — CONSULT (OUTPATIENT)
Dept: CARDIOLOGY | Facility: CLINIC | Age: 45
End: 2020-08-28

## 2020-08-28 VITALS
TEMPERATURE: 96.9 F | HEART RATE: 61 BPM | WEIGHT: 225 LBS | HEIGHT: 60 IN | DIASTOLIC BLOOD PRESSURE: 76 MMHG | SYSTOLIC BLOOD PRESSURE: 104 MMHG | BODY MASS INDEX: 44.17 KG/M2

## 2020-08-28 DIAGNOSIS — G90.3 NEUROGENIC ORTHOSTATIC HYPOTENSION (HCC): ICD-10-CM

## 2020-08-28 DIAGNOSIS — R00.2 PALPITATIONS: Primary | ICD-10-CM

## 2020-08-28 DIAGNOSIS — R42 DIZZINESS: ICD-10-CM

## 2020-08-28 DIAGNOSIS — E66.01 SEVERE OBESITY (BMI 35.0-39.9) WITH COMORBIDITY (HCC): ICD-10-CM

## 2020-08-28 DIAGNOSIS — R06.83 SNORING: ICD-10-CM

## 2020-08-28 DIAGNOSIS — I49.5 SSS (SICK SINUS SYNDROME) (HCC): ICD-10-CM

## 2020-08-28 PROCEDURE — 93000 ELECTROCARDIOGRAM COMPLETE: CPT | Performed by: NURSE PRACTITIONER

## 2020-08-28 PROCEDURE — 99204 OFFICE O/P NEW MOD 45 MIN: CPT | Performed by: INTERNAL MEDICINE

## 2020-08-28 RX ORDER — HYDROXYZINE PAMOATE 25 MG/1
50 CAPSULE ORAL NIGHTLY
COMMUNITY
Start: 2020-08-25 | End: 2023-02-20

## 2020-08-28 RX ORDER — ROSUVASTATIN CALCIUM 10 MG/1
10 TABLET, COATED ORAL DAILY
COMMUNITY
Start: 2020-07-01 | End: 2022-01-11 | Stop reason: SDUPTHER

## 2021-01-11 ENCOUNTER — OFFICE VISIT (OUTPATIENT)
Dept: CARDIOLOGY | Facility: CLINIC | Age: 46
End: 2021-01-11

## 2021-01-11 VITALS
SYSTOLIC BLOOD PRESSURE: 124 MMHG | BODY MASS INDEX: 45.62 KG/M2 | HEIGHT: 60 IN | DIASTOLIC BLOOD PRESSURE: 64 MMHG | HEART RATE: 64 BPM | TEMPERATURE: 97.6 F | WEIGHT: 232.4 LBS

## 2021-01-11 DIAGNOSIS — I49.5 SSS (SICK SINUS SYNDROME) (HCC): Primary | ICD-10-CM

## 2021-01-11 DIAGNOSIS — R06.83 SNORING: ICD-10-CM

## 2021-01-11 DIAGNOSIS — Z95.0 PRESENCE OF CARDIAC PACEMAKER: ICD-10-CM

## 2021-01-11 DIAGNOSIS — G47.00 INSOMNIA, UNSPECIFIED TYPE: ICD-10-CM

## 2021-01-11 DIAGNOSIS — R06.00 PND (PAROXYSMAL NOCTURNAL DYSPNEA): ICD-10-CM

## 2021-01-11 DIAGNOSIS — E66.01 SEVERE OBESITY (BMI 35.0-39.9) WITH COMORBIDITY (HCC): ICD-10-CM

## 2021-01-11 DIAGNOSIS — G90.3 NEUROGENIC ORTHOSTATIC HYPOTENSION (HCC): ICD-10-CM

## 2021-01-11 PROCEDURE — 93000 ELECTROCARDIOGRAM COMPLETE: CPT | Performed by: NURSE PRACTITIONER

## 2021-01-11 PROCEDURE — 99214 OFFICE O/P EST MOD 30 MIN: CPT | Performed by: NURSE PRACTITIONER

## 2021-01-11 RX ORDER — AMITRIPTYLINE HYDROCHLORIDE 25 MG/1
12.5 TABLET, FILM COATED ORAL NIGHTLY
COMMUNITY
End: 2023-02-20

## 2021-01-11 RX ORDER — GABAPENTIN 300 MG/1
300 CAPSULE ORAL 3 TIMES DAILY
COMMUNITY
End: 2022-01-11

## 2021-01-11 NOTE — PATIENT INSTRUCTIONS
Mediterranean Diet  A Mediterranean diet refers to food and lifestyle choices that are based on the traditions of countries located on the [place]. This way of eating has been shown to help prevent certain conditions and improve outcomes for people who have chronic diseases, like kidney disease and heart disease.  What are tips for following this plan?  Lifestyle  · Cook and eat meals together with your family, when possible.  · Drink enough fluid to keep your urine clear or pale yellow.  · Be physically active every day. This includes:  ? Aerobic exercise like running or swimming.  ? Leisure activities like gardening, walking, or housework.  · Get 7-8 hours of sleep each night.  · If recommended by your health care provider, drink red wine in moderation. This means 1 glass a day for nonpregnant women and 2 glasses a day for men. A glass of wine equals 5 oz (150 mL).  Reading food labels    · Check the serving size of packaged foods. For foods such as rice and pasta, the serving size refers to the amount of cooked product, not dry.  · Check the total fat in packaged foods. Avoid foods that have saturated fat or trans fats.  · Check the ingredients list for added sugars, such as corn syrup.  Shopping  · At the grocery store, buy most of your food from the areas near the walls of the store. This includes:  ? Fresh fruits and vegetables (produce).  ? Grains, beans, nuts, and seeds. Some of these may be available in unpackaged forms or large amounts (in bulk).  ? Fresh seafood.  ? Poultry and eggs.  ? Low-fat dairy products.  · Buy whole ingredients instead of prepackaged foods.  · Buy fresh fruits and vegetables in-season from local farmers markets.  · Buy frozen fruits and vegetables in resealable bags.  · If you do not have access to quality fresh seafood, buy precooked frozen shrimp or canned fish, such as tuna, salmon, or sardines.  · Buy small amounts of raw or cooked vegetables, salads, or olives from  the deli or salad bar at your store.  · Stock your pantry so you always have certain foods on hand, such as olive oil, canned tuna, canned tomatoes, rice, pasta, and beans.  Cooking  · Cook foods with extra-virgin olive oil instead of using butter or other vegetable oils.  · Have meat as a side dish, and have vegetables or grains as your main dish. This means having meat in small portions or adding small amounts of meat to foods like pasta or stew.  · Use beans or vegetables instead of meat in common dishes like chili or lasagna.  · [place] with different cooking methods. Try roasting or broiling vegetables instead of steaming or sautéeing them.  · Add frozen vegetables to soups, stews, pasta, or rice.  · Add nuts or seeds for added healthy fat at each meal. You can add these to yogurt, salads, or vegetable dishes.  · Marinate fish or vegetables using olive oil, lemon juice, garlic, and fresh herbs.  Meal planning    · Plan to eat 1 vegetarian meal one day each week. Try to work up to 2 vegetarian meals, if possible.  · Eat seafood 2 or more times a week.  · Have healthy snacks readily available, such as:  ? Vegetable sticks with hummus.  ? Greek yogurt.  ? Fruit and nut trail mix.  · Eat balanced meals throughout the week. This includes:  ? Fruit: 2-3 servings a day  ? Vegetables: 4-5 servings a day  ? Low-fat dairy: 2 servings a day  ? Fish, poultry, or lean meat: 1 serving a day  ? Beans and legumes: 2 or more servings a week  ? Nuts and seeds: 1-2 servings a day  ? Whole grains: 6-8 servings a day  ? Extra-virgin olive oil: 3-4 servings a day  · Limit red meat and sweets to only a few servings a month  What are my food choices?  · Mediterranean diet  ? Recommended  § Grains: Whole-grain pasta. Brown rice. Bulgar wheat. Polenta. Couscous. Whole-wheat bread. Oatmeal. Quinoa.  § Vegetables: Artichokes. Beets. Broccoli. Cabbage. Carrots. Eggplant. Green beans. Chard. Kale. Spinach. Onions. Leeks. Peas. Squash.  Tomatoes. Peppers. Radishes.  § Fruits: Apples. Apricots. Avocado. Berries. Bananas. Cherries. Dates. Figs. Grapes. Nae. Melon. Oranges. Peaches. Plums. Pomegranate.  § Meats and other protein foods: Beans. Almonds. Sunflower seeds. Pine nuts. Peanuts. Cod. [place]. Scallops. Shrimp. Tuna. Tilapia. Clams. Oysters. Eggs.  § Dairy: Low-fat milk. Cheese. Greek yogurt.  § Beverages: Water. Red wine. Herbal tea.  § Fats and oils: Extra virgin olive oil. Avocado oil. Grape seed oil.  § Sweets and desserts: Greek yogurt with honey. Baked apples. Poached pears. Trail mix.  § Seasoning and other foods: Basil. Cilantro. Coriander. Cumin. Mint. Parsley. Jamie. Rosemary. Tarragon. Garlic. Oregano. Thyme. Pepper. Balsalmic vinegar. Tahini. Hummus. Tomato sauce. Olives. Mushrooms.  ? Limit these  § Grains: Prepackaged pasta or rice dishes. Prepackaged cereal with added sugar.  § Vegetables: Deep fried potatoes (french fries).  § Fruits: Fruit canned in syrup.  § Meats and other protein foods: Beef. Pork. Lamb. Poultry with skin. Hot dogs. [place].  § Dairy: Ice cream. Sour cream. Whole milk.  § Beverages: Juice. Sugar-sweetened soft drinks. Beer. Liquor and spirits.  § Fats and oils: Butter. Canola oil. Vegetable oil. Beef fat (tallow). Lard.  § Sweets and desserts: Cookies. Cakes. Pies. Candy.  § Seasoning and other foods: Mayonnaise. Premade sauces and marinades.  The items listed may not be a complete list. Talk with your dietitian about what dietary choices are right for you.  Summary  · The Mediterranean diet includes both food and lifestyle choices.  · Eat a variety of fresh fruits and vegetables, beans, nuts, seeds, and whole grains.  · Limit the amount of red meat and sweets that you eat.  · Talk with your health care provider about whether it is safe for you to drink red wine in moderation. This means 1 glass a day for nonpregnant women and 2 glasses a day for men. A glass of wine equals 5 oz (150 mL).  This information  is not intended to replace advice given to you by your health care provider. Make sure you discuss any questions you have with your health care provider.  Document Revised: 08/17/2017 Document Reviewed: 08/10/2017  [employer]vier Patient Education © 2020 [employer]  Calorie Counting for Weight Loss  Calories are units of energy. Your body needs a certain amount of calories from food to keep you going throughout the day. When you eat more calories than your body needs, your body stores the extra calories as fat. When you eat fewer calories than your body needs, your body burns fat to get the energy it needs.  Calorie counting means keeping track of how many calories you eat and drink each day. Calorie counting can be helpful if you need to lose weight. If you make sure to eat fewer calories than your body needs, you should lose weight. Ask your health care provider what a healthy weight is for you.  For calorie counting to work, you will need to eat the right number of calories in a day in order to lose a healthy amount of weight per week. A dietitian can help you determine how many calories you need in a day and will give you suggestions on how to reach your calorie goal.  · A healthy amount of weight to lose per week is usually 1-2 lb (0.5-0.9 kg). This usually means that your daily calorie intake should be reduced by 500-750 calories.  · Eating 1,200 - 1,500 calories per day can help most women lose weight.  · Eating 1,500 - 1,800 calories per day can help most men lose weight.  What is my plan?  My goal is to have __________ calories per day.  If I have this many calories per day, I should lose around __________ pounds per week.  What do I need to know about calorie counting?  In order to meet your daily calorie goal, you will need to:  · Find out how many calories are in each food you would like to eat. Try to do this before you eat.  · Decide how much of the food you plan to eat.  · Write down what you ate and how  many calories it had. Doing this is called keeping a food log.  To successfully lose weight, it is important to balance calorie counting with a healthy lifestyle that includes regular activity. Aim for 150 minutes of moderate exercise (such as walking) or 75 minutes of vigorous exercise (such as running) each week.  Where do I find calorie information?    The number of calories in a food can be found on a Nutrition Facts label. If a food does not have a Nutrition Facts label, try to look up the calories online or ask your dietitian for help.  Remember that calories are listed per serving. If you choose to have more than one serving of a food, you will have to multiply the calories per serving by the amount of servings you plan to eat. For example, the label on a package of bread might say that a serving size is 1 slice and that there are 90 calories in a serving. If you eat 1 slice, you will have eaten 90 calories. If you eat 2 slices, you will have eaten 180 calories.  How do I keep a food log?  Immediately after each meal, record the following information in your food log:  · What you ate. Don't forget to include toppings, sauces, and other extras on the food.  · How much you ate. This can be measured in cups, ounces, or number of items.  · How many calories each food and drink had.  · The total number of calories in the meal.  Keep your food log near you, such as in a small notebook in your pocket, or use a mobile hardeep or website. Some programs will calculate calories for you and show you how many calories you have left for the day to meet your goal.  What are some calorie counting tips?    · Use your calories on foods and drinks that will fill you up and not leave you hungry:  ? Some examples of foods that fill you up are nuts and nut butters, vegetables, lean proteins, and high-fiber foods like whole grains. High-fiber foods are foods with more than 5 g fiber per serving.  ? Drinks such as sodas, specialty  "coffee drinks, alcohol, and juices have a lot of calories, yet do not fill you up.  · Eat nutritious foods and avoid empty calories. Empty calories are calories you get from foods or beverages that do not have many vitamins or protein, such as candy, sweets, and soda. It is better to have a nutritious high-calorie food (such as an avocado) than a food with few nutrients (such as a bag of chips).  · Know how many calories are in the foods you eat most often. This will help you calculate calorie counts faster.  · Pay attention to calories in drinks. Low-calorie drinks include water and unsweetened drinks.  · Pay attention to nutrition labels for \"low fat\" or \"fat free\" foods. These foods sometimes have the same amount of calories or more calories than the full fat versions. They also often have added sugar, starch, or salt, to make up for flavor that was removed with the fat.  · Find a way of tracking calories that works for you. Get creative. Try different apps or programs if writing down calories does not work for you.  What are some portion control tips?  · Know how many calories are in a serving. This will help you know how many servings of a certain food you can have.  · Use a measuring cup to measure serving sizes. You could also try weighing out portions on a kitchen scale. With time, you will be able to estimate serving sizes for some foods.  · Take some time to put servings of different foods on your favorite plates, bowls, and cups so you know what a serving looks like.  · Try not to eat straight from a bag or box. Doing this can lead to overeating. Put the amount you would like to eat in a cup or on a plate to make sure you are eating the right portion.  · Use smaller plates, glasses, and bowls to prevent overeating.  · Try not to multitask (for example, watch TV or use your computer) while eating. If it is time to eat, sit down at a table and enjoy your food. This will help you to know when you are full. " "It will also help you to be aware of what you are eating and how much you are eating.  What are tips for following this plan?  Reading food labels  · Check the calorie count compared to the serving size. The serving size may be smaller than what you are used to eating.  · Check the source of the calories. Make sure the food you are eating is high in vitamins and protein and low in saturated and trans fats.  Shopping  · Read nutrition labels while you shop. This will help you make healthy decisions before you decide to purchase your food.  · Make a grocery list and stick to it.  Cooking  · Try to cook your favorite foods in a healthier way. For example, try baking instead of frying.  · Use low-fat dairy products.  Meal planning  · Use more fruits and vegetables. Half of your plate should be fruits and vegetables.  · Include lean proteins like poultry and fish.  How do I count calories when eating out?  · Ask for smaller portion sizes.  · Consider sharing an entree and sides instead of getting your own entree.  · If you get your own entree, eat only half. Ask for a box at the beginning of your meal and put the rest of your entree in it so you are not tempted to eat it.  · If calories are listed on the menu, choose the lower calorie options.  · Choose dishes that include vegetables, fruits, whole grains, low-fat dairy products, and lean protein.  · Choose items that are boiled, broiled, grilled, or steamed. Stay away from items that are buttered, battered, fried, or served with cream sauce. Items labeled \"crispy\" are usually fried, unless stated otherwise.  · Choose water, low-fat milk, unsweetened iced tea, or other drinks without added sugar. If you want an alcoholic beverage, choose a lower calorie option such as a glass of wine or light beer.  · Ask for dressings, sauces, and syrups on the side. These are usually high in calories, so you should limit the amount you eat.  · If you want a salad, choose a garden salad " and ask for grilled meats. Avoid extra toppings like casey, cheese, or fried items. Ask for the dressing on the side, or ask for olive oil and vinegar or lemon to use as dressing.  · Estimate how many servings of a food you are given. For example, a serving of cooked rice is ½ cup or about the size of half a baseball. Knowing serving sizes will help you be aware of how much food you are eating at restaurants. The list below tells you how big or small some common portion sizes are based on everyday objects:  ? 1 oz--4 stacked dice.  ? 3 oz--1 deck of cards.  ? 1 tsp--1 die.  ? 1 Tbsp--½ a ping-pong ball.  ? 2 Tbsp--1 ping-pong ball.  ? ½ cup--½ baseball.  ? 1 cup--1 baseball.  Summary  · Calorie counting means keeping track of how many calories you eat and drink each day. If you eat fewer calories than your body needs, you should lose weight.  · A healthy amount of weight to lose per week is usually 1-2 lb (0.5-0.9 kg). This usually means reducing your daily calorie intake by 500-750 calories.  · The number of calories in a food can be found on a Nutrition Facts label. If a food does not have a Nutrition Facts label, try to look up the calories online or ask your dietitian for help.  · Use your calories on foods and drinks that will fill you up, and not on foods and drinks that will leave you hungry.  · Use smaller plates, glasses, and bowls to prevent overeating.  This information is not intended to replace advice given to you by your health care provider. Make sure you discuss any questions you have with your health care provider.  Document Revised: 09/06/2019 Document Reviewed: 11/17/2017  [employer] Patient Education © 2020 [employer]  Physical Activity With Heart Disease  Being active has many benefits, especially if you have heart disease. Physical activity can help you do more and feel healthier. Start slowly, and increase the amount of time you spend being active. Most adults should aim for physical activity  that:  · Makes you breathe harder and raises your heart rate (aerobic activity). Try to get at least 150 minutes of aerobic activity each week. This is about 30 minutes each day, 5 days a week.  · Helps build muscle strength (strengthening activity). Do this at least 2 times a week.  Always talk with your health care provider before starting any new activity program or if you have any changes in your condition.  What are the benefits of physical activity?  When you have heart disease, physical activity can help:  · Lower your blood pressure.  · Lower your cholesterol.  · Control your weight.  · Improve your sleep.  · Help control your blood sugar.  · Improve your heart and lung function.  · Reduce your risk for blood clots (thrombophlebitis).  · Improve your energy level.  · Reduce stress.  What are some types of physical activity I could try?  There are many ways to be active. Talk with your health care provider about what types and intensity of activity is right for you.  Aerobic activity    Aerobic (cardiovascular) activity can be moderate or vigorous intensity, depending on how hard you are working.  Moderate-intensity activity includes:  · Walking.  · Slow bicycling.  · Water aerobics.  · Dancing.  · Light gardening or house work.  Vigorous-intensity activity includes:  · Jogging or running.  · Stair climbing.  · Swimming laps.  · Hiking uphill.  · Heavy gardening, such as digging trenches.    Strengthening activity  Strengthening activities work your muscles to build strength. Some examples include:  · Doing push-ups, sit-ups, or pull-ups.  · Lifting small weights.  · Using resistance bands.    Flexibility  Flexibility activities lengthen your muscles to keep them flexible and less tight and improve your balance. Some examples include:  · Stretching.  · Yoga.  · Jose Angel chi.  · Ballet barre.    Follow these instructions at home:  How to get started  · Talk with your health care provider about:  ? What types of  activities are safe for you.  ? If you should check your pulse or take other precautions during physical activity.  · Get a calendar. Write down a schedule and plan for your new routine.  · Take time to find out what works for you. Consider:  ? Joining a community program, such as a biking group, yoga class, local gym, or swimming pool membership.  ? Be active on your own by downloading free workout applications on a smartphone or other devices, or by purchasing workout DVDs.  · If you have not been active, begin with sessions that last 10-15 minutes. Gradually work up to sessions that last 20-30 minutes, 5 times a week. Follow all of your health care provider's recommendations.  · Be patient with yourself. It takes time to build up strength and lung capacity.  Safety  · Exercise in an indoor, climate-controlled facility, as told by your health care provider. You may need to do this if:  ? There are extreme outdoor conditions, such as heat, humidity, or cold.  ? There is an air pollution advisory. Your local news, board of health, or hospital can provide information on air quality.  · Take extra precautions as told by your health care provider. This may include:  ? Monitoring your heart rate.  ? Avoiding heavy lifting.  ? Understanding how your medicines can affect you during physical activity. Certain medicines may cause heat intolerance or changes in blood sugar.  ? Slowing down to rest when you need to.  ? Keeping nitroglycerin spray and tablets with you at all times if you have angina. Use them as told to prevent and treat symptoms.  · Drink plenty of water before, during, and after physical activity.  · Know what symptoms may be signs of a problem. Stop physical activity right away if you have any of these symptoms.  Get help right away if you have any of the following during exercise:  · Chest pain, shortness of breath, or feel very tired.  · Pain in the arm, shoulder, neck, or jaw.  · Feel weak, dizzy, or  light-headed.  · An irregular heart rate, or your heart rate is greater than 100 beats per minute (bpm) before exercise.  These symptoms may represent a serious problem that is an emergency. Do not wait to see if the symptoms go away. Get medical help right away. Call your local emergency services (911 in the [place] Do not drive yourself to the hospital.   Summary  · Physical activity has many benefits, especially if you have heart disease.  · Before starting an activity program, talk with your health care provider about how often to be active and what type of activity is safe for you.  · Your physical activity plan may include moderate or vigorous aerobic activity, strengthening activities, and flexibility.  · Know what symptoms may be signs of a problem. Stop physical activity right away and call emergency services (911 in the [place] if you have any of these symptoms.  This information is not intended to replace advice given to you by your health care provider. Make sure you discuss any questions you have with your health care provider.  Document Revised: 01/09/2019 Document Reviewed: 01/09/2019  [employer] Patient Education © 2020 [employer]

## 2021-01-11 NOTE — PROGRESS NOTES
Chief Complaint   Patient presents with   • Follow-up     Cardiac management   • Palpitations     Has noticed palpitations  , more frequent latley   • Dizziness     Has dizzines while at rest and with movement   • Med Refill     Needs refills onMetoprolol. 90 day supply to [facility]/ [place]       [place]       Kathy Peña is a 45 y.o. female  with PTSD, depression, SSS, POTS, and history of seizures secondary to head trauma at age [age]. She saw Dr. Conde for her initial consult on 5/3/18 for palpitations and syncope.  Holter monitor at [facility] prior to consult showed sinus melissa with no significant pause or arrhythmia and repeated 4 day monitor here essentially the same. She did not have syncope while wearing monitor in May. Nuclear stress showed no ischemia and normal LV function.  Then extended monitor was advised as she continued to report syncopal episodes in June and it showed several episodes of marked bradycardia (lowest 31), (2) 2 second pauses, and idioventricular run with rate of 43. She was  then referred to Dr. Coulter for SJM PPM placement for SSS.  Our office was notified for high rate VT per Merlin on 8/12/2018. She had interrogation on 8/13/2018 showed no VT but NST and pacer setting changes were made. She underwent TTT which was positive for neurocardiogenic syncope, diagnosed with POTS, managed with midodrine and Northera.    At last visit advised to continue Northera only. A referral was made to EP per patient request for management of intermittent palpitations and ppm. Interrogation of pacemaker showed normal function without arrhythmias to correlate patient symptoms. Recommendation was for sleep study to look for undiagnosed ROGERS. According to patient she now follows with pain management for arthritis and fibromyalgia.     Today she comes the office for follow-up visit.  Overall palpitations have improved since pacemaker placement.  She does admit to symptoms suggestive of sleep  apnea but was unable to keep appointment with pulmonologist in [place].  Her other concern is weight gain which she feels may be related to Northera.  No recent syncopal episodes noted.  No TIA type symptoms noted.  She also  follows with neurologist and psychologist.    Cardiac History:    Past Surgical History:   Procedure Laterality Date   • CARDIAC PACEMAKER PLACEMENT  07/16/2018    Dr. Coulter- [facility] dual-chamber ppm   • CARDIOVASCULAR STRESS TEST  05/08/2018    (Mod) 7 Min, 27 Secs. 7.7 METS. 76% THR. BP- 161/79. Breast Attenuation.   • CONVERTED (HISTORICAL) HOLTER  04/25/2018    @[facility]. AVG HR- 57 BPM. Minimum- 38 BPM   • CONVERTED (HISTORICAL) HOLTER  05/08/2018    4 day- avg 61 bpm, multiple sinus melissa, 6 PVC, 1 PAC   • CONVERTED (HISTORICAL) HOLTER  07/05/2018    AVG HR 48 BPM. Min 31 BPM. 2 pauses W/O symptoms.One ideoventricular run at 43 BPM   • ECHO - CONVERTED  05/08/2018    EF 65%. RVSP- 18 mmHg   • OTHER SURGICAL HISTORY  05/23/2018    Tilt Table Test- positive for neurocardiogenic syncope        Current Outpatient Medications   Medication Sig Dispense Refill   • albuterol sulfate  (90 Base) MCG/ACT inhaler Inhale 2 puffs Every 4 (Four) Hours As Needed for Wheezing.     • amitriptyline (ELAVIL) 25 MG tablet Take 25 mg by mouth Every Night. 1/2 tablet daily     • ARIPiprazole (ABILIFY) 2 MG tablet Take 5 mg by mouth Daily.     • baclofen (LIORESAL) 10 MG tablet Take 10 mg by mouth 3 (Three) Times a Day.     • cetirizine (zyrTEC) 10 MG tablet Take 10 mg by mouth Daily.     • droxidopa (Northera) 300 MG capsule capsule Take 2 capsules by mouth 3 (Three) Times a Day. 540 capsule 3   • gabapentin (NEURONTIN) 300 MG capsule Take 300 mg by mouth 3 (Three) Times a Day.     • hydrOXYzine pamoate (VISTARIL) 25 MG capsule Take 25 mg by mouth Daily.     • metoprolol tartrate (LOPRESSOR) 25 MG tablet Take 1 tablet by mouth 2 (Two) Times a Day. 180 tablet 3   • omeprazole (priLOSEC) 20 MG capsule Take 20  mg by mouth Daily.     • ondansetron (ZOFRAN) 4 MG tablet Take 4 mg by mouth 2 (Two) Times a Day As Needed for Nausea or Vomiting.     • rosuvastatin (CRESTOR) 10 MG tablet Take 10 mg by mouth Daily.     • sertraline (ZOLOFT) 100 MG tablet 2 tabs daily       No current facility-administered medications for this visit.        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)    • History of colon resection    • Hx of cholecystectomy    • Hx of tubal ligation    • Hypotension    • Low back pain    • Moderate depressive disorder    • Osteoarthritis    • Palpitations    • PTSD (post-traumatic stress disorder)    • Stroke (CMS/HCC)    • Vitamin D deficiency        Social History     Socioeconomic History   • Marital status:      Spouse name: Not on file   • Number of children: Not on file   • Years of education: Not on file   • Highest education level: Not on file   Tobacco Use   • Smoking status: Former Smoker     Quit date: 5/3/1997     Years since quittin.7   • Smokeless tobacco: Never Used   Substance and Sexual Activity   • Alcohol use: No   • Drug use: No       Family History   Problem Relation Age of Onset   • Heart disease Mother    • Clotting disorder Mother    • Hypertension Mother    • Hyperlipidemia Mother    • Asthma Mother    • Anemia Mother    • Heart disease Sister    • Hypertension Sister    • Heart attack Maternal Grandmother    • Heart attack Maternal Grandfather    • Heart attack Paternal Grandmother    • Heart attack Paternal Grandfather        Review of Systems   Constitution: Positive for malaise/fatigue. Negative for decreased appetite and diaphoresis.   HENT: Negative for nosebleeds.    Eyes: Negative for blurred vision.   Cardiovascular: Positive for orthopnea,  "palpitations and paroxysmal nocturnal dyspnea. Negative for chest pain, claudication, cyanosis, dyspnea on exertion, irregular heartbeat, leg swelling, near-syncope and syncope.   Respiratory: Positive for sleep disturbances due to breathing and snoring. Negative for shortness of breath.    Endocrine: Negative for cold intolerance and heat intolerance.   Hematologic/Lymphatic: Does not bruise/bleed easily.   Skin: Negative for rash.   Musculoskeletal: Positive for arthritis, joint pain and myalgias.   Gastrointestinal: Negative for heartburn, melena and nausea.   Genitourinary: Negative for dysuria and hematuria.   Neurological: Positive for dizziness, headaches and light-headedness.   Psychiatric/Behavioral: The patient has insomnia and is nervous/anxious.         BP Readings from Last 5 Encounters:   01/11/21 124/64   08/28/20 104/76   07/15/20 (!) 102/0   01/29/20 90/60   05/15/19 100/70       Wt Readings from Last 5 Encounters:   01/11/21 105 kg (232 lb 6.4 oz)   08/28/20 102 kg (225 lb)   07/15/20 103 kg (227 lb 6.4 oz)   01/29/20 92.1 kg (203 lb)   05/15/19 84.4 kg (186 lb)         Objective     /64 (BP Location: Right arm)   Pulse 64   Temp 97.6 °F (36.4 °C)   Ht 152.4 cm (60\")   Wt 105 kg (232 lb 6.4 oz)   BMI 45.39 kg/m²     Vitals signs and nursing note reviewed.   Eyes:      Pupils: Pupils are equal, round, and reactive to light.   HENT:      Head: Normocephalic.   Neck:      Musculoskeletal: Normal range of motion.      Vascular: No carotid bruit.   Pulmonary:      Breath sounds: Normal breath sounds.   Cardiovascular:      Normal rate. Regular rhythm.   Edema:     Peripheral edema absent.   Abdominal:      General: Bowel sounds are normal.      Palpations: Abdomen is soft.   Musculoskeletal: Normal range of motion.   Skin:     General: Skin is warm.   Neurological:      Mental Status: Alert and oriented to person, place, and time.            ECG 12 Lead    Date/Time: 1/11/2021 1:37 " PM  Performed by: Anne Mendoza APRN  Authorized by: Anne Mendoza APRN   Comparison: compared with previous ECG from 7/15/2020  Similar to previous ECG  Comparison to previous ECG: Atrial paced and ventricular sensed  Rhythm: paced  BPM: 64  Pacin% capture             Assessment/Plan     Diagnoses and all orders for this visit:    1. SSS (sick sinus syndrome) (CMS/HCC) (Primary)  -     Ambulatory Referral to Pulmonology    2. Presence of cardiac pacemaker    3. Neurogenic orthostatic hypotension (CMS/HCC)    4. Severe obesity (BMI 35.0-39.9) with comorbidity (CMS/HCC)  -     Ambulatory Referral to Pulmonology    5. PND (paroxysmal nocturnal dyspnea)  -     Ambulatory Referral to Pulmonology    6. Insomnia, unspecified type  -     Ambulatory Referral to Pulmonology    7. Snoring  -     Ambulatory Referral to Pulmonology    Other orders  -     ECG 12 Lead      SSS/pacemaker-EKG today shows atrial pacing with ventricular sensing.  A pacemaker interrogation scheduled for February.    Neurogenic orthostatic hypotension.  Blood pressure currently controlled.  Continue beta-blocker and Northera.  She follows with neurologist.     Obesity-patient's Body mass index is 45.39 kg/m². BMI is above normal parameters. Recommendations include: nutrition counseling.  Informational handout on diet for weight loss and BMI provided.    PND/insomnia-referral made to local pulmonologist for recommendations regarding sleep study.  Due to travel patient requested referral to be made in [place].    Patient appears stable from a cardiac standpoint.  A 6-month follow-up visit scheduled.  Please call sooner for cardiac concerns.                Electronically signed by ROSE Quinn,  2021 13:41 EST

## 2021-02-03 ENCOUNTER — OFFICE VISIT (OUTPATIENT)
Dept: CARDIOLOGY | Facility: CLINIC | Age: 46
End: 2021-02-03

## 2021-02-03 DIAGNOSIS — R00.2 PALPITATIONS: ICD-10-CM

## 2021-02-03 DIAGNOSIS — I49.5 SSS (SICK SINUS SYNDROME) (HCC): Primary | ICD-10-CM

## 2021-02-03 PROCEDURE — 93280 PM DEVICE PROGR EVAL DUAL: CPT | Performed by: INTERNAL MEDICINE

## 2021-02-11 ENCOUNTER — TELEPHONE (OUTPATIENT)
Dept: CARDIOLOGY | Facility: CLINIC | Age: 46
End: 2021-02-11

## 2021-02-11 NOTE — TELEPHONE ENCOUNTER
Patient just had pace maker checked and is c/o heart racing from  and wanted to know if they changed any settings.    Please call

## 2021-02-12 NOTE — TELEPHONE ENCOUNTER
Pt aware. She is going to continue to monitor and will call back if symptoms persist to have a holter ordered.

## 2021-03-04 ENCOUNTER — TELEPHONE (OUTPATIENT)
Dept: CARDIOLOGY | Facility: CLINIC | Age: 46
End: 2021-03-04

## 2021-03-04 NOTE — TELEPHONE ENCOUNTER
Yazmin from [facility] Restrepo NP at a neurology office is asking for clearance  to have an MRI.    Pt has a [facility] dual chamber device.     Phone: [phone]

## 2021-03-23 NOTE — TELEPHONE ENCOUNTER
[facility] clearance form was filled out and signed by Dr. Conde.  Faxed on 3/19/21 to Neurologist office.  See scanned in document.

## 2021-07-12 ENCOUNTER — OFFICE VISIT (OUTPATIENT)
Dept: CARDIOLOGY | Facility: CLINIC | Age: 46
End: 2021-07-12

## 2021-07-12 VITALS
WEIGHT: 253.2 LBS | HEART RATE: 78 BPM | DIASTOLIC BLOOD PRESSURE: 70 MMHG | BODY MASS INDEX: 49.71 KG/M2 | SYSTOLIC BLOOD PRESSURE: 110 MMHG | HEIGHT: 60 IN

## 2021-07-12 DIAGNOSIS — G90.3 NEUROGENIC ORTHOSTATIC HYPOTENSION (HCC): ICD-10-CM

## 2021-07-12 DIAGNOSIS — E78.5 HYPERLIPIDEMIA LDL GOAL <100: ICD-10-CM

## 2021-07-12 DIAGNOSIS — R00.2 PALPITATIONS: ICD-10-CM

## 2021-07-12 DIAGNOSIS — R06.02 SHORTNESS OF BREATH: ICD-10-CM

## 2021-07-12 DIAGNOSIS — G90.A POTS (POSTURAL ORTHOSTATIC TACHYCARDIA SYNDROME): ICD-10-CM

## 2021-07-12 DIAGNOSIS — I49.5 SSS (SICK SINUS SYNDROME) (HCC): Primary | ICD-10-CM

## 2021-07-12 PROCEDURE — 99213 OFFICE O/P EST LOW 20 MIN: CPT | Performed by: NURSE PRACTITIONER

## 2021-07-12 RX ORDER — ESTRADIOL 2 MG/1
2 TABLET ORAL DAILY
COMMUNITY
End: 2022-07-14 | Stop reason: ALTCHOICE

## 2021-07-12 RX ORDER — DROXIDOPA 300 MG/1
600 CAPSULE ORAL 3 TIMES DAILY
Qty: 540 CAPSULE | Refills: 3 | Status: SHIPPED | OUTPATIENT
Start: 2021-07-12 | End: 2021-12-01 | Stop reason: SDUPTHER

## 2021-07-12 RX ORDER — CYCLOBENZAPRINE HCL 10 MG
10 TABLET ORAL 3 TIMES DAILY
COMMUNITY

## 2021-07-12 RX ORDER — TOPIRAMATE 25 MG/1
25 TABLET ORAL 3 TIMES DAILY
COMMUNITY
End: 2022-01-11

## 2021-07-12 RX ORDER — OXYCODONE HYDROCHLORIDE AND ACETAMINOPHEN 5; 325 MG/1; MG/1
1 TABLET ORAL 3 TIMES DAILY
COMMUNITY

## 2021-07-12 RX ORDER — RIZATRIPTAN BENZOATE 10 MG/1
10 TABLET ORAL AS NEEDED
COMMUNITY
End: 2022-01-11

## 2021-07-12 NOTE — PROGRESS NOTES
Chief Complaint   Patient presents with   • Follow-up     Cardiac management   • Lab     Last labs in June per Dr Ventura.   • Pacemaker Check     Last [facility] PPM interrogation 2/3/21.   • Dizziness     Feels related to hormones.   • Med Refill     Needs refills on cardiac medications-90 day.     Subjective       Kathy Peña is a 45 y.o. female with PTSD, depression, SSS, POTS, and history of seizures secondary to head trauma at age [age]. She saw Dr. Conde for her initial consult on 5/3/18 for palpitations and syncope.  Holter monitor melissa with no significant pause or arrhythmia, repeated 4 day monitor here was essentially the same. She did not have syncope while wearing monitor. Nuclear stress showed no ischemia and normal LV function. She continued to report syncopal episodes and extended monitor showed several episodes of marked bradycardia (lowest 31), (2) 2 second pauses, and idioventricular run with rate of 43. She was  then referred to Dr. Coulter for SJM PPM placement for SSS. She underwent TTT which was positive for neurocardiogenic syncope, diagnosed with POTS, managed with midodrine then Northera. She was evaluated by EP for high rates and remote monitor. They felt she was stable. Rule out sleep apnea. No changes made. She was seen by pulmonary with plan to PFT as she was felt to have stress induced asthma. Sleep study revealed very mild apnea while supine. No CPAP.     She returns today for regular follow up. She has had no syncope. Continues to have intermittent dizziness but improved. SJM device check 2/2021 showed 27% atrial pacing, no arrhythmia. Labs followed by PCP and neurology?  Her main concern is weight gain which she feels is secondary to Northera.        Cardiac History:    Past Surgical History:   Procedure Laterality Date   • CARDIAC PACEMAKER PLACEMENT  07/16/2018    Dr. Coulter- [facility] dual-chamber ppm   • CARDIOVASCULAR STRESS TEST  05/08/2018    (Mod) 7 Min, 27 Secs. 7.7 METS.  76% THR. BP- 161/79. Breast Attenuation.   • CONVERTED (HISTORICAL) HOLTER  04/25/2018    @[facility]. AVG HR- 57 BPM. Minimum- 38 BPM   • CONVERTED (HISTORICAL) HOLTER  05/08/2018    4 day- avg 61 bpm, multiple sinus melissa, 6 PVC, 1 PAC   • CONVERTED (HISTORICAL) HOLTER  07/05/2018    AVG HR 48 BPM. Min 31 BPM. 2 pauses W/O symptoms.One ideoventricular run at 43 BPM   • ECHO - CONVERTED  05/08/2018    EF 65%. RVSP- 18 mmHg   • OTHER SURGICAL HISTORY  05/23/2018    Tilt Table Test- positive for neurocardiogenic syncope      Current Outpatient Medications   Medication Sig Dispense Refill   • albuterol sulfate  (90 Base) MCG/ACT inhaler Inhale 2 puffs Every 4 (Four) Hours As Needed for Wheezing.     • amitriptyline (ELAVIL) 25 MG tablet Take 12.5 mg by mouth Every Night.     • ARIPiprazole (ABILIFY) 2 MG tablet Take 5 mg by mouth Daily.     • Calcium Polycarbophil (FIBER-CAPS PO) Take  by mouth Daily.     • cetirizine (zyrTEC) 10 MG tablet Take 10 mg by mouth Daily.     • Cholecalciferol (Vitamin D-3) 125 MCG (5000 UT) tablet Take  by mouth Daily.     • cyclobenzaprine (FLEXERIL) 10 MG tablet Take 10 mg by mouth 3 (Three) Times a Day.     • droxidopa (Northera) 300 MG capsule capsule Take 2 capsules by mouth 3 (Three) Times a Day. (reduce to 2 QAM, 2 in afternoon, 1 QPM) 540 capsule 3   • estradiol (ESTRACE) 2 MG tablet Take 2 mg by mouth Daily.     • gabapentin (NEURONTIN) 300 MG capsule Take 300 mg by mouth 3 (Three) Times a Day.     • hydrOXYzine pamoate (VISTARIL) 25 MG capsule Take 50 mg by mouth Every Night.     • metoprolol tartrate (LOPRESSOR) 25 MG tablet Take 1 tablet by mouth 2 (Two) Times a Day. 180 tablet 3   • Multiple Vitamins-Minerals (MULTI ADULT GUMMIES PO) Take  by mouth Daily.     • omeprazole (priLOSEC) 20 MG capsule Take 20 mg by mouth Daily.     • ondansetron (ZOFRAN) 4 MG tablet Take 4 mg by mouth 2 (Two) Times a Day As Needed for Nausea or Vomiting.     • oxyCODONE-acetaminophen (PERCOCET)  5-325 MG per tablet Take 1 tablet by mouth 3 (Three) Times a Day.     • rizatriptan (MAXALT) 10 MG tablet Take 10 mg by mouth As Needed for Migraine. May repeat in 2 hours if needed     • rosuvastatin (CRESTOR) 10 MG tablet Take 10 mg by mouth Daily.     • sertraline (ZOLOFT) 100 MG tablet 2 tabs daily     • topiramate (TOPAMAX) 25 MG tablet Take 25 mg by mouth 3 (Three) Times a Day.     • Fluticasone Furoate-Vilanterol (BREO ELLIPTA) 100-25 MCG/INH inhaler Inhale 1 puff Daily. 1 each 8     No current facility-administered medications for this visit.     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)    • History of colon resection    • Hx of cholecystectomy    • Hx of tubal ligation    • Hypotension    • Low back pain    • Moderate depressive disorder    • Osteoarthritis    • Palpitations    • POTS (postural orthostatic tachycardia syndrome)    • PTSD (post-traumatic stress disorder)    • Stroke (CMS/HCC)    • Vitamin D deficiency      Social History     Socioeconomic History   • Marital status:      Spouse name: Not on file   • Number of children: Not on file   • Years of education: Not on file   • Highest education level: Not on file   Tobacco Use   • Smoking status: Former Smoker     Quit date: 5/3/1997     Years since quittin.2   • Smokeless tobacco: Never Used   Vaping Use   • Vaping Use: Never used   Substance and Sexual Activity   • Alcohol use: No   • Drug use: No     Family History   Problem Relation Age of Onset   • Heart disease Mother    • Clotting disorder Mother    • Hypertension Mother    • Hyperlipidemia Mother    • Asthma Mother    • Anemia Mother    • Heart disease Sister    • Hypertension Sister    • Heart attack Maternal Grandmother    • Heart attack  "Maternal Grandfather    • Heart attack Paternal Grandmother    • Heart attack Paternal Grandfather      Review of Systems   Constitutional: Positive for weight gain (up 28 lb in one year). Negative for decreased appetite and malaise/fatigue.   HENT: Negative.    Eyes: Negative for blurred vision.   Cardiovascular: Positive for dyspnea on exertion. Negative for chest pain, leg swelling, palpitations and syncope.   Respiratory: Positive for shortness of breath. Negative for sleep disturbances due to breathing.    Endocrine: Negative.    Hematologic/Lymphatic: Negative for bleeding problem. Does not bruise/bleed easily.   Skin: Negative.    Musculoskeletal: Negative for falls and myalgias.   Gastrointestinal: Negative for abdominal pain, heartburn and melena.   Genitourinary: Negative for hematuria.   Neurological: Negative for dizziness and light-headedness.   Psychiatric/Behavioral: Negative for altered mental status.   Allergic/Immunologic: Negative.       Objective     /70 (BP Location: Right arm)   Pulse 78   Ht 152.4 cm (60\")   Wt 115 kg (253 lb 3.2 oz)   BMI 49.45 kg/m²     Vitals and nursing note reviewed.   Constitutional:       General: Not in acute distress.     Appearance: Well-developed. Not diaphoretic.   Eyes:      Pupils: Pupils are equal, round, and reactive to light.   HENT:      Head: Normocephalic.   Pulmonary:      Effort: Pulmonary effort is normal. No respiratory distress.      Breath sounds: Normal breath sounds.   Cardiovascular:      Normal rate. Regular rhythm.      Murmurs: There is a grade 1/6 systolic murmur at the apex.   Pulses:     Intact distal pulses.   Edema:     Peripheral edema absent.   Abdominal:      General: Bowel sounds are normal.      Palpations: Abdomen is soft.   Musculoskeletal: Normal range of motion.      Cervical back: Normal range of motion. Skin:     General: Skin is warm and dry.   Neurological:      Mental Status: Alert and oriented to person, place, and " time.          ECG 12 Lead    Date/Time: 7/14/2021 10:47 AM  Performed by: Tiesha Ragland APRN  Authorized by: Tiesha Ragland APRN   Comparison: compared with previous ECG from 1/11/2021  Comparison to previous ECG: No pacing noted today  Rhythm: sinus rhythm  Rate: normal  BPM: 78  ST Segments: ST segments normal    Clinical impression: non-specific ECG                Problem List Items Addressed This Visit        Cardiac and Vasculature    Palpitations    Relevant Orders    ECG 12 Lead    SSS (sick sinus syndrome) (CMS/HCC) - Primary    Relevant Medications    droxidopa (Northera) 300 MG capsule capsule    metoprolol tartrate (LOPRESSOR) 25 MG tablet    Other Relevant Orders    ECG 12 Lead    Hyperlipidemia LDL goal <100       Neuro    Neurogenic orthostatic hypotension (CMS/HCC)       Pulmonary and Pneumonias    Shortness of breath      Other Visit Diagnoses     POTS (postural orthostatic tachycardia syndrome)        Relevant Orders    ECG 12 Lead         1. SSS- EKG today showed NSR, no pacing. Will check her device next clinic.     2. POTS/neurocardiogenic syncope- stable without recurrent syncope. Continue metoprolol. She feels Northera has caused significant weight gain. She agrees to try weaning down the dose to 2 QAM, 2QPM, 1 QHS x 2 weeks. If no recurrent dizziness, then reduce to 2 QAM, 1 QPM, 1QHS. Advised to call if not tolerated.     3. Hyperlipidemia- managed with Crestor, tolerating well. Labs followed by PCP.     4. Asthma- not well controlled. Using albuterol more frequently. Will try Breo 100/25 mg 1 p daily until she sees Dr. Mcmillan. Advised to call their office for appt.     Encouraged adequate fluid intake. Labs with PCP. Could we get copy? Device check scheduled. FU in 6 months.   Patient's Body mass index is 49.45 kg/m². indicating that she is obese (BMI >30). Obesity-related health conditions include the following: obstructive sleep apnea, hypertension, coronary heart disease, diabetes mellitus  and dyslipidemias. Obesity is worsening. BMI is is above average; BMI management plan is completed. We discussed portion control and increasing exercise..               Electronically signed by ROSE Cope,  July 14, 2021 10:49 EDT

## 2021-07-14 PROBLEM — E78.5 HYPERLIPIDEMIA LDL GOAL <100: Status: ACTIVE | Noted: 2021-07-14

## 2021-07-14 PROCEDURE — 93000 ELECTROCARDIOGRAM COMPLETE: CPT | Performed by: NURSE PRACTITIONER

## 2021-12-01 ENCOUNTER — TELEPHONE (OUTPATIENT)
Dept: CARDIOLOGY | Facility: CLINIC | Age: 46
End: 2021-12-01

## 2021-12-01 ENCOUNTER — OFFICE VISIT (OUTPATIENT)
Dept: CARDIOLOGY | Facility: CLINIC | Age: 46
End: 2021-12-01

## 2021-12-01 DIAGNOSIS — R00.2 PALPITATIONS: ICD-10-CM

## 2021-12-01 DIAGNOSIS — I49.5 SSS (SICK SINUS SYNDROME) (HCC): Primary | ICD-10-CM

## 2021-12-01 PROCEDURE — 93288 INTERROG EVL PM/LDLS PM IP: CPT | Performed by: INTERNAL MEDICINE

## 2021-12-01 RX ORDER — DROXIDOPA 300 MG/1
600 CAPSULE ORAL 3 TIMES DAILY
Qty: 540 CAPSULE | Refills: 3 | Status: SHIPPED | OUTPATIENT
Start: 2021-12-01 | End: 2022-01-11 | Stop reason: SDUPTHER

## 2021-12-01 NOTE — TELEPHONE ENCOUNTER
"Patient called stating \"I tried to go off the Northera from last visit, but I had to restart due to low B/P, dizziness and heart rate fluctuating. I had to restart the Northera.\"    Patient asking for refill on previous dose of northera 600 mg TID.  "

## 2022-01-11 ENCOUNTER — OFFICE VISIT (OUTPATIENT)
Dept: CARDIOLOGY | Facility: CLINIC | Age: 47
End: 2022-01-11

## 2022-01-11 VITALS
TEMPERATURE: 97.8 F | HEIGHT: 60 IN | SYSTOLIC BLOOD PRESSURE: 118 MMHG | DIASTOLIC BLOOD PRESSURE: 84 MMHG | HEART RATE: 81 BPM | BODY MASS INDEX: 49.52 KG/M2 | WEIGHT: 252.2 LBS

## 2022-01-11 DIAGNOSIS — R07.89 CHEST DISCOMFORT: ICD-10-CM

## 2022-01-11 DIAGNOSIS — R01.1 HEART MURMUR: Primary | ICD-10-CM

## 2022-01-11 DIAGNOSIS — Z95.0 PRESENCE OF CARDIAC PACEMAKER: ICD-10-CM

## 2022-01-11 DIAGNOSIS — R06.02 SHORTNESS OF BREATH: ICD-10-CM

## 2022-01-11 DIAGNOSIS — I49.5 SSS (SICK SINUS SYNDROME): ICD-10-CM

## 2022-01-11 DIAGNOSIS — E78.5 HYPERLIPIDEMIA LDL GOAL <100: ICD-10-CM

## 2022-01-11 DIAGNOSIS — R00.2 PALPITATIONS: ICD-10-CM

## 2022-01-11 DIAGNOSIS — G90.A POTS (POSTURAL ORTHOSTATIC TACHYCARDIA SYNDROME): ICD-10-CM

## 2022-01-11 PROCEDURE — 93000 ELECTROCARDIOGRAM COMPLETE: CPT | Performed by: NURSE PRACTITIONER

## 2022-01-11 PROCEDURE — 99214 OFFICE O/P EST MOD 30 MIN: CPT | Performed by: NURSE PRACTITIONER

## 2022-01-11 RX ORDER — DROXIDOPA 300 MG/1
600 CAPSULE ORAL 3 TIMES DAILY
Qty: 540 CAPSULE | Refills: 3 | Status: SHIPPED | OUTPATIENT
Start: 2022-01-11 | End: 2022-03-03

## 2022-01-11 RX ORDER — ROSUVASTATIN CALCIUM 10 MG/1
10 TABLET, COATED ORAL DAILY
Qty: 90 TABLET | Refills: 2 | Status: SHIPPED | OUTPATIENT
Start: 2022-01-11 | End: 2022-07-14 | Stop reason: SDUPTHER

## 2022-01-11 RX ORDER — ERENUMAB-AOOE 140 MG/ML
140 INJECTION, SOLUTION SUBCUTANEOUS
COMMUNITY
Start: 2021-12-29 | End: 2022-07-14 | Stop reason: ALTCHOICE

## 2022-01-11 RX ORDER — DULOXETIN HYDROCHLORIDE 20 MG/1
20 CAPSULE, DELAYED RELEASE ORAL 2 TIMES DAILY
COMMUNITY
End: 2023-02-20

## 2022-01-11 RX ORDER — RIMEGEPANT SULFATE 75 MG/75MG
75 TABLET, ORALLY DISINTEGRATING ORAL DAILY PRN
COMMUNITY
Start: 2021-12-16 | End: 2022-07-14 | Stop reason: ALTCHOICE

## 2022-01-11 NOTE — PROGRESS NOTES
Chief Complaint   Patient presents with   • Follow-up     Pt is here for cardiac follow up.  She admits to CP, SOB, dizziness or palpitations.  Pulmonology told her they thought she had cardiomyopathy.  She was also recently dx with ROGERS.   • Med Refill     Pt request 90 day refills to be sent to [facility] in [place].   • Lab Work     Pt states her last labs were with her PCP last month.       Cardiac Complaints  none      Subjective   Kathy Peña is a 46 y.o. female with PTSD, palpitations, depression, SSS, POTS, and history of seizures secondary to head trauma at age [age]. She saw Dr. Conde for her initial consult on 5/3/18 for palpitations and syncope.  Holter monitor melissa with no significant pause or arrhythmia, repeated 4 day monitor here was essentially the same. She did not have syncope while wearing monitor. Nuclear stress showed no ischemia and normal LV function. She continued to report syncopal episodes and extended monitor showed several episodes of marked bradycardia (lowest 31), (2) 2 second pauses, and idioventricular run with rate of 43. She was  then referred to Dr. Coulter for SJM PPM placement for SSS. She underwent TTT which was positive for neurocardiogenic syncope, diagnosed with POTS, managed with midodrine then Northera. She was evaluated by EP for high rates and remote monitor. They felt she was stable. Rule out sleep apnea. No changes made. She was seen by pulmonary with plan to PFT as she was felt to have stress induced asthma. Sleep study revealed very mild apnea while supine. Most recent SJM pacer check 12/1/2021 showed normal function, 11% atrial pacing, no arrhythmia, and about 9 to 10 years ago.     She returns today for follow up and admits to CP, SOA, dizziness, and palpitations. She does report these have worsened since last visit and she reports at anytime of the day. She states when she has these events, it takes her breath away and makes her light headed. She  does report that her lung doctor advised she may be suffering from cardiomyopathy. She has been diagnosed with sleep apnea, but she has not yet received her CPAP machine. Labs are with PCP and checked last month, no current available. Refills requested.         Cardiac History  Past Surgical History:   Procedure Laterality Date   • CARDIAC PACEMAKER PLACEMENT  07/16/2018    Dr. Coulter- [facility] dual-chamber ppm   • CARDIOVASCULAR STRESS TEST  05/08/2018    (Mod) 7 Min, 27 Secs. 7.7 METS. 76% THR. BP- 161/79. Breast Attenuation.   • CONVERTED (HISTORICAL) HOLTER  04/25/2018    @[facility]. AVG HR- 57 BPM. Minimum- 38 BPM   • CONVERTED (HISTORICAL) HOLTER  05/08/2018    4 day- avg 61 bpm, multiple sinus melissa, 6 PVC, 1 PAC   • CONVERTED (HISTORICAL) HOLTER  07/05/2018    AVG HR 48 BPM. Min 31 BPM. 2 pauses W/O symptoms.One ideoventricular run at 43 BPM   • ECHO - CONVERTED  05/08/2018    EF 65%. RVSP- 18 mmHg   • OTHER SURGICAL HISTORY  05/23/2018    Tilt Table Test- positive for neurocardiogenic syncope        Current Outpatient Medications   Medication Sig Dispense Refill   • amitriptyline (ELAVIL) 25 MG tablet Take 12.5 mg by mouth Every Night.     • ARIPiprazole (ABILIFY) 2 MG tablet Take 5 mg by mouth Daily.     • cetirizine (zyrTEC) 10 MG tablet Take 10 mg by mouth Daily.     • cyclobenzaprine (FLEXERIL) 10 MG tablet Take 10 mg by mouth 3 (Three) Times a Day.     • droxidopa (Northera) 300 MG capsule capsule Take 2 capsules by mouth 3 (Three) Times a Day. 540 capsule 3   • DULoxetine (CYMBALTA) 20 MG capsule Take 20 mg by mouth 2 (Two) Times a Day.     • estradiol (ESTRACE) 2 MG tablet Take 2 mg by mouth Daily.     • Fluticasone Furoate-Vilanterol (BREO ELLIPTA) 100-25 MCG/INH inhaler Inhale 1 puff Daily. 1 each 8   • hydrOXYzine pamoate (VISTARIL) 25 MG capsule Take 50 mg by mouth Every Night.     • metFORMIN (GLUCOPHAGE) 500 MG tablet Take 500 mg by mouth 2 (Two) Times a Day With Meals.     • metoprolol tartrate  (LOPRESSOR) 25 MG tablet Take 1 tablet by mouth 2 (Two) Times a Day. 180 tablet 3   • omeprazole (priLOSEC) 20 MG capsule Take 20 mg by mouth Daily.     • ondansetron (ZOFRAN) 4 MG tablet Take 4 mg by mouth 2 (Two) Times a Day As Needed for Nausea or Vomiting.     • oxyCODONE-acetaminophen (PERCOCET) 5-325 MG per tablet Take 1 tablet by mouth 3 (Three) Times a Day.     • rosuvastatin (CRESTOR) 10 MG tablet Take 1 tablet by mouth Daily. 90 tablet 2   • sertraline (ZOLOFT) 100 MG tablet 2 tabs daily     • Aimovig 140 MG/ML prefilled syringe Inject 140 mg under the skin into the appropriate area as directed Every 30 (Thirty) Days.     • Nurtec 75 MG dispersible tablet Place 75 mg under the tongue Daily As Needed.       No current facility-administered medications for this visit.       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)    • History of colon resection    • Hx of cholecystectomy    • Hx of tubal ligation    • Hypotension    • Low back pain    • Moderate depressive disorder (HCC)    • ROGERS (obstructive sleep apnea)    • Osteoarthritis    • Palpitations    • POTS (postural orthostatic tachycardia syndrome)    • PTSD (post-traumatic stress disorder)    • Stroke (HCC)    • Vitamin D deficiency        Social History     Socioeconomic History   • Marital status:    Tobacco Use   • Smoking status: Former Smoker     Quit date: 5/3/1997     Years since quittin.7   • Smokeless tobacco: Never Used   Vaping Use   • Vaping Use: Never used   Substance and Sexual Activity   • Alcohol use: No   • Drug use: No       Family History   Problem Relation Age of Onset   • Heart disease Mother    • Clotting disorder Mother    • Hypertension Mother    • Hyperlipidemia Mother    •  "Asthma Mother    • Anemia Mother    • Heart disease Sister    • Hypertension Sister    • Heart attack Maternal Grandmother    • Heart attack Maternal Grandfather    • Heart attack Paternal Grandmother    • Heart attack Paternal Grandfather        Review of Systems   Constitutional: Negative for malaise/fatigue and night sweats.   Cardiovascular: Negative for chest pain, claudication, dyspnea on exertion, irregular heartbeat, leg swelling, near-syncope, orthopnea, palpitations and syncope.   Respiratory: Negative for cough, shortness of breath and wheezing.    Musculoskeletal: Positive for stiffness. Negative for back pain and joint pain.   Gastrointestinal: Negative for anorexia, heartburn, melena and nausea.   Genitourinary: Negative for dysuria, hematuria, hesitancy and nocturia.   Neurological: Negative for dizziness, headaches and light-headedness.   Psychiatric/Behavioral: Negative for depression and memory loss. The patient is not nervous/anxious.            Objective     /84 (BP Location: Left arm, Patient Position: Sitting)   Pulse 81   Temp 97.8 °F (36.6 °C)   Ht 152.4 cm (60\")   Wt 114 kg (252 lb 3.2 oz)   BMI 49.25 kg/m²     Constitutional:       Appearance: Not in distress.   Eyes:      Pupils: Pupils are equal, round, and reactive to light.   HENT:      Nose: Nose normal.   Pulmonary:      Effort: Pulmonary effort is normal.      Breath sounds: Normal breath sounds.   Cardiovascular:      PMI at left midclavicular line. Normal rate. Regular rhythm.      Murmurs: There is a systolic murmur.   Abdominal:      Palpations: Abdomen is soft.   Musculoskeletal: Normal range of motion.      Cervical back: Normal range of motion and neck supple. Skin:     General: Skin is warm and dry.   Neurological:      Mental Status: Oriented to person, place and time.           ECG 12 Lead    Date/Time: 1/11/2022 2:11 PM  Performed by: Delmy Silva APRN  Authorized by: Delmy Silva APRN   Comparison: " compared with previous ECG from 7/14/2021  Similar to previous ECG  Rhythm: sinus rhythm  BPM: 82    Clinical impression: non-specific ECG  Clinical impression comment: non specific ST changes  Comments: Normal QT            Assessment/Plan     SSS:  EKG done today showed NSR with non specific ST changes and normal QT. Most recent [facility] pacer check December 2021 showed normal function. We will order device check after next visit.     SOA/chest tightness:  Echo advised to assess valve areas and for any LV dysfunction. More recommendations to follow.    POTS: Stable without syncope. Continue current metoprolol therapy.  We did try weaning northera therapy but she could not tolerate. BP dropped and HR increased, we will go back to prior dosing.     Hyperlipidemia:  On crestor therapy.  Patient tolerates well. FLP has been followed by your office. Can we have for review?  Limited starch/carbs advised.     ROGERS:  She has been prescribed CPAP therapy but it has not yet arrived. Followed by pulmonary. Using medication therapy.      BMI noted at 49.25, good cardiac diet with limited carbs, calories, and activity as tolerated advised. We urged to limit carb intake.    Refills per request.    6 month follow up advised or sooner if needed.        Problems Addressed this Visit        Cardiac and Vasculature    Palpitations    SSS (sick sinus syndrome) (HCC)    Relevant Medications    droxidopa (Northera) 300 MG capsule capsule    metoprolol tartrate (LOPRESSOR) 25 MG tablet    Other Relevant Orders    ECG 12 Lead    Hyperlipidemia LDL goal <100    Relevant Medications    rosuvastatin (CRESTOR) 10 MG tablet       Pulmonary and Pneumonias    Shortness of breath    Relevant Orders    Adult Transthoracic Echo Complete W/ Cont if Necessary Per Protocol      Other Visit Diagnoses     Heart murmur    -  Primary    Relevant Orders    Adult Transthoracic Echo Complete W/ Cont if Necessary Per Protocol    Chest discomfort        Relevant  Orders    Adult Transthoracic Echo Complete W/ Cont if Necessary Per Protocol    Presence of cardiac pacemaker        POTS (postural orthostatic tachycardia syndrome)          Diagnoses       Codes Comments    Heart murmur    -  Primary ICD-10-CM: R01.1  ICD-9-CM: 785.2     SSS (sick sinus syndrome) (HCC)     ICD-10-CM: I49.5  ICD-9-CM: 427.81     Shortness of breath     ICD-10-CM: R06.02  ICD-9-CM: 786.05     Chest discomfort     ICD-10-CM: R07.89  ICD-9-CM: 786.59     Palpitations     ICD-10-CM: R00.2  ICD-9-CM: 785.1     Hyperlipidemia LDL goal <100     ICD-10-CM: E78.5  ICD-9-CM: 272.4     Presence of cardiac pacemaker     ICD-10-CM: Z95.0  ICD-9-CM: V45.01     POTS (postural orthostatic tachycardia syndrome)     ICD-10-CM: I49.8  ICD-9-CM: 427.89           Patient's Body mass index is 49.25 kg/m². indicating that she is obesity. Good cardiac diet with limited carbs, calories, and activity as tolerated advised.           Electronically signed by ROSE Espinal January 11, 2022 16:39 EST

## 2022-02-01 ENCOUNTER — HOSPITAL ENCOUNTER (OUTPATIENT)
Dept: CARDIOLOGY | Facility: HOSPITAL | Age: 47
End: 2022-02-01

## 2022-02-07 ENCOUNTER — APPOINTMENT (OUTPATIENT)
Dept: CARDIOLOGY | Facility: HOSPITAL | Age: 47
End: 2022-02-07

## 2022-02-14 ENCOUNTER — HOSPITAL ENCOUNTER (OUTPATIENT)
Dept: CARDIOLOGY | Facility: HOSPITAL | Age: 47
Discharge: HOME | End: 2022-02-14
Admitting: NURSE PRACTITIONER

## 2022-02-14 VITALS — HEIGHT: 60 IN | WEIGHT: 251.32 LBS | BODY MASS INDEX: 49.34 KG/M2

## 2022-02-14 DIAGNOSIS — R07.89 CHEST DISCOMFORT: ICD-10-CM

## 2022-02-14 DIAGNOSIS — R01.1 HEART MURMUR: ICD-10-CM

## 2022-02-14 DIAGNOSIS — R06.02 SHORTNESS OF BREATH: ICD-10-CM

## 2022-02-14 LAB
AORTIC DIMENSIONLESS INDEX: 0.93 (DI)
BH CV ECHO MEAS - AO MAX PG (FULL): 0.34 MMHG
BH CV ECHO MEAS - AO MAX PG: 2.7 MMHG
BH CV ECHO MEAS - AO MEAN PG (FULL): 0 MMHG
BH CV ECHO MEAS - AO MEAN PG: 1 MMHG
BH CV ECHO MEAS - AO ROOT AREA (BSA CORRECTED): 1.5
BH CV ECHO MEAS - AO ROOT AREA: 7.5 CM^2
BH CV ECHO MEAS - AO ROOT DIAM: 3.1 CM
BH CV ECHO MEAS - AO V2 MAX: 81.8 CM/SEC
BH CV ECHO MEAS - AO V2 MEAN: 51.4 CM/SEC
BH CV ECHO MEAS - AO V2 VTI: 18 CM
BH CV ECHO MEAS - BSA(HAYCOCK): 2.3 M^2
BH CV ECHO MEAS - BSA: 2.1 M^2
BH CV ECHO MEAS - BZI_BMI: 49.2 KILOGRAMS/M^2
BH CV ECHO MEAS - BZI_METRIC_HEIGHT: 152.4 CM
BH CV ECHO MEAS - BZI_METRIC_WEIGHT: 114.3 KG
BH CV ECHO MEAS - EDV(CUBED): 130.3 ML
BH CV ECHO MEAS - EDV(TEICH): 122.1 ML
BH CV ECHO MEAS - EF(CUBED): 64.2 %
BH CV ECHO MEAS - EF(MOD-BP): 55 %
BH CV ECHO MEAS - EF(TEICH): 55.4 %
BH CV ECHO MEAS - ESV(CUBED): 46.7 ML
BH CV ECHO MEAS - ESV(TEICH): 54.4 ML
BH CV ECHO MEAS - FS: 29 %
BH CV ECHO MEAS - IVS/LVPW: 0.88
BH CV ECHO MEAS - IVSD: 0.94 CM
BH CV ECHO MEAS - LA DIMENSION(2D): 4 CM
BH CV ECHO MEAS - LA DIMENSION: 3.9 CM
BH CV ECHO MEAS - LA/AO: 1.3
BH CV ECHO MEAS - LAT PEAK E' VEL: 7.2 CM/SEC
BH CV ECHO MEAS - LV IVRT: 0.12 SEC
BH CV ECHO MEAS - LV MASS(C)D: 187.3 GRAMS
BH CV ECHO MEAS - LV MASS(C)DI: 91 GRAMS/M^2
BH CV ECHO MEAS - LV MAX PG: 2.3 MMHG
BH CV ECHO MEAS - LV MEAN PG: 1 MMHG
BH CV ECHO MEAS - LV V1 MAX: 76.4 CM/SEC
BH CV ECHO MEAS - LV V1 MEAN: 48 CM/SEC
BH CV ECHO MEAS - LV V1 VTI: 18.6 CM
BH CV ECHO MEAS - LVIDD: 5.1 CM
BH CV ECHO MEAS - LVIDS: 3.6 CM
BH CV ECHO MEAS - LVPWD: 1.1 CM
BH CV ECHO MEAS - MED PEAK E' VEL: 6.5 CM/SEC
BH CV ECHO MEAS - MV A MAX VEL: 53.7 CM/SEC
BH CV ECHO MEAS - MV DEC SLOPE: 571 CM/SEC^2
BH CV ECHO MEAS - MV DEC TIME: 0.19 SEC
BH CV ECHO MEAS - MV E MAX VEL: 91.8 CM/SEC
BH CV ECHO MEAS - MV E/A: 1.7
BH CV ECHO MEAS - MV MAX PG: 4.5 MMHG
BH CV ECHO MEAS - MV MEAN PG: 1 MMHG
BH CV ECHO MEAS - MV P1/2T MAX VEL: 97.6 CM/SEC
BH CV ECHO MEAS - MV P1/2T: 50.1 MSEC
BH CV ECHO MEAS - MV V2 MAX: 106 CM/SEC
BH CV ECHO MEAS - MV V2 MEAN: 49.1 CM/SEC
BH CV ECHO MEAS - MV V2 VTI: 24.2 CM
BH CV ECHO MEAS - MVA P1/2T LCG: 2.3 CM^2
BH CV ECHO MEAS - MVA(P1/2T): 4.4 CM^2
BH CV ECHO MEAS - PA MAX PG (FULL): 1.2 MMHG
BH CV ECHO MEAS - PA MAX PG: 2.9 MMHG
BH CV ECHO MEAS - PA MEAN PG (FULL): 1 MMHG
BH CV ECHO MEAS - PA MEAN PG: 2 MMHG
BH CV ECHO MEAS - PA V2 MAX: 84.6 CM/SEC
BH CV ECHO MEAS - PA V2 MEAN: 57.4 CM/SEC
BH CV ECHO MEAS - PA V2 VTI: 19.2 CM
BH CV ECHO MEAS - RAP SYSTOLE: 10 MMHG
BH CV ECHO MEAS - RV MAX PG: 1.7 MMHG
BH CV ECHO MEAS - RV MEAN PG: 1 MMHG
BH CV ECHO MEAS - RV V1 MAX: 65.3 CM/SEC
BH CV ECHO MEAS - RV V1 MEAN: 39.7 CM/SEC
BH CV ECHO MEAS - RV V1 VTI: 14.5 CM
BH CV ECHO MEAS - RVDD: 3.8 CM
BH CV ECHO MEAS - RVSP: 42 MMHG
BH CV ECHO MEAS - SI(AO): 66 ML/M^2
BH CV ECHO MEAS - SI(CUBED): 40.6 ML/M^2
BH CV ECHO MEAS - SI(TEICH): 32.9 ML/M^2
BH CV ECHO MEAS - SV(AO): 135.9 ML
BH CV ECHO MEAS - SV(CUBED): 83.7 ML
BH CV ECHO MEAS - SV(TEICH): 67.7 ML
BH CV ECHO MEAS - TR MAX VEL: 280 CM/SEC
BH CV ECHO MEASUREMENTS AVERAGE E/E' RATIO: 13.4
IVRT: 116 MSEC
MAXIMAL PREDICTED HEART RATE: 176 BPM
SINUS: 2.8 CM
STRESS TARGET HR: 150 BPM

## 2022-02-14 PROCEDURE — 93306 TTE W/DOPPLER COMPLETE: CPT | Performed by: INTERNAL MEDICINE

## 2022-02-14 PROCEDURE — 93306 TTE W/DOPPLER COMPLETE: CPT

## 2022-02-14 NOTE — PROGRESS NOTES
EF looks good 51-55%, she does have some mild to mod pulm HTN. She does have sleep apnea and has not gotten CPAP yet. This should improve with use of mask.

## 2022-03-01 ENCOUNTER — TELEPHONE (OUTPATIENT)
Dept: CARDIOLOGY | Facility: CLINIC | Age: 47
End: 2022-03-01

## 2022-03-01 NOTE — TELEPHONE ENCOUNTER
Let her know. I don't know what we can try, she has done all the other meds. (midodrine and florinef)

## 2022-03-03 RX ORDER — MIDODRINE HYDROCHLORIDE 10 MG/1
10 TABLET ORAL
Qty: 270 TABLET | Refills: 3 | Status: SHIPPED | OUTPATIENT
Start: 2022-03-03 | End: 2023-02-06

## 2022-03-03 NOTE — TELEPHONE ENCOUNTER
I Spoke with the pt, she spent an hour and a half on the phone with her insurance trying to get them to approve her northera.  She did not get them to approve.  She states she did once take midodrine 10mg TID and would not care to try it again- if we could get insurance to cover it.

## 2022-05-06 ENCOUNTER — OUTSIDE FACILITY SERVICE (OUTPATIENT)
Dept: CARDIOLOGY | Facility: CLINIC | Age: 47
End: 2022-05-06

## 2022-05-06 PROCEDURE — 93306 TTE W/DOPPLER COMPLETE: CPT | Performed by: INTERNAL MEDICINE

## 2022-05-06 PROCEDURE — 99223 1ST HOSP IP/OBS HIGH 75: CPT | Performed by: INTERNAL MEDICINE

## 2022-07-14 ENCOUNTER — OFFICE VISIT (OUTPATIENT)
Dept: CARDIOLOGY | Facility: CLINIC | Age: 47
End: 2022-07-14

## 2022-07-14 VITALS
DIASTOLIC BLOOD PRESSURE: 60 MMHG | HEART RATE: 84 BPM | WEIGHT: 238.8 LBS | SYSTOLIC BLOOD PRESSURE: 112 MMHG | HEIGHT: 60 IN | BODY MASS INDEX: 46.88 KG/M2

## 2022-07-14 DIAGNOSIS — K21.9 GASTROESOPHAGEAL REFLUX DISEASE, UNSPECIFIED WHETHER ESOPHAGITIS PRESENT: ICD-10-CM

## 2022-07-14 DIAGNOSIS — R00.2 PALPITATIONS: ICD-10-CM

## 2022-07-14 DIAGNOSIS — G90.3 NEUROGENIC ORTHOSTATIC HYPOTENSION: ICD-10-CM

## 2022-07-14 DIAGNOSIS — D68.51 FACTOR V LEIDEN: ICD-10-CM

## 2022-07-14 DIAGNOSIS — G47.33 OSA (OBSTRUCTIVE SLEEP APNEA): ICD-10-CM

## 2022-07-14 DIAGNOSIS — I82.4Y9 ACUTE DEEP VEIN THROMBOSIS (DVT) OF PROXIMAL VEIN OF LOWER EXTREMITY, UNSPECIFIED LATERALITY: ICD-10-CM

## 2022-07-14 DIAGNOSIS — G90.A POTS (POSTURAL ORTHOSTATIC TACHYCARDIA SYNDROME): ICD-10-CM

## 2022-07-14 DIAGNOSIS — R01.1 MURMUR, CARDIAC: ICD-10-CM

## 2022-07-14 DIAGNOSIS — E78.5 HYPERLIPIDEMIA LDL GOAL <100: ICD-10-CM

## 2022-07-14 DIAGNOSIS — Z95.0 PRESENCE OF CARDIAC PACEMAKER: ICD-10-CM

## 2022-07-14 DIAGNOSIS — I49.5 SSS (SICK SINUS SYNDROME): Primary | ICD-10-CM

## 2022-07-14 PROCEDURE — 93000 ELECTROCARDIOGRAM COMPLETE: CPT | Performed by: NURSE PRACTITIONER

## 2022-07-14 PROCEDURE — 99214 OFFICE O/P EST MOD 30 MIN: CPT | Performed by: NURSE PRACTITIONER

## 2022-07-14 RX ORDER — OMEPRAZOLE 20 MG/1
20 CAPSULE, DELAYED RELEASE ORAL DAILY
Qty: 90 CAPSULE | Refills: 1 | Status: SHIPPED | OUTPATIENT
Start: 2022-07-14 | End: 2023-02-20 | Stop reason: SDUPTHER

## 2022-07-14 RX ORDER — SEMAGLUTIDE 1.34 MG/ML
0.5 INJECTION, SOLUTION SUBCUTANEOUS WEEKLY
COMMUNITY

## 2022-07-14 RX ORDER — ROPINIROLE 0.5 MG/1
0.5 TABLET, FILM COATED ORAL NIGHTLY
COMMUNITY

## 2022-07-14 RX ORDER — PAROXETINE HYDROCHLORIDE 20 MG/1
20 TABLET, FILM COATED ORAL EVERY MORNING
COMMUNITY

## 2022-07-14 RX ORDER — ROSUVASTATIN CALCIUM 10 MG/1
10 TABLET, COATED ORAL DAILY
Qty: 90 TABLET | Refills: 2 | Status: SHIPPED | OUTPATIENT
Start: 2022-07-14 | End: 2023-02-20 | Stop reason: SDUPTHER

## 2022-07-14 RX ORDER — PANTOPRAZOLE SODIUM 20 MG/1
20 TABLET, DELAYED RELEASE ORAL DAILY
COMMUNITY
End: 2022-07-14

## 2022-07-14 NOTE — PROGRESS NOTES
Chief Complaint   Patient presents with   • Hospital Follow Up Visit     Patient hospitalized in May 2022 for PE and DVT   • Pacemaker Check     [facility] device check December 2021 .   • LABS and TESTING     Had labs  and ECHO at [facility]  May 2022. Results for Echo on chart . Will obtains labs   • Palpitations     Reports palpitations have gotten worse  since May 2022.  Is noticing them daily    • Med Refill     Needs refills on Crestor 90 day supply to [facility] ph[facility]       Subjective       Kathy Peña is a 46 y.o. female with PTSD, palpitations, depression, SSS, POTS, and history of seizures secondary to head trauma at age [age]. She saw Dr. Conde for her initial consult on 5/3/18 for palpitations and syncope.  Holter monitor melissa with no significant pause or arrhythmia, repeated 4 day monitor here was essentially the same. She did not have syncope while wearing monitor. Nuclear stress showed no ischemia and normal LV function. She continued to report syncopal episodes and extended monitor showed several episodes of marked bradycardia (lowest 31), (2) 2 second pauses, and idioventricular run with rate of 43. She was  then referred to Dr. Coulter for SJM PPM placement for SSS. She underwent TTT which was positive for neurocardiogenic syncope, diagnosed with POTS, managed with midodrine then Northera. She was evaluated by EP for high rates and remote monitor. They felt she was stable. Rule out sleep apnea. No changes made. She was seen by pulmonary with plan to PFT as she was felt to have stress induced asthma. Sleep study revealed very mild apnea while supine.      The patient presents today for a hospital follow-up visit. In 05/2022, she presented to [facility] for chest tightness. Her labs showed a small bump in the troponin. She was transferred to [facility] in which they discovered a blod clot that broke and traveled into her lungs. At that time, she was on a heparin drip. Prior to discharge,  "they converted her over to Eliquis. She is currently taking Eliquis which she is tolerating well. She has small bruises around her body but states they are not as severe as they used to be.    The patient states her symptoms have improved, but she still gets winded really easy and has shortness of breath. She states her heart is always \"fluttered\". She has been extremely lightheaded, weak, and has difficulty moving around recently. Her blood pressure has been normal. She is on midodrine to help keep her blood pressure stable. She is also on metoprolol tartrate 25 mg twice a day.    The patient has been actively trying to lose weight and had lost 11 pounds. Her primary care physician put her on Ozempic once a week and it is working well.    The patient states that her omeprazole was changed to Protonix, but she would like a different medication due to severe heartburn. She states that the omeprazole worked better for her than the Protonix, but her body is accustomed to omeprazole. She would like to start taking omeprazole again. The patient states she was told that she will be on some type of blood thinner for the rest of her life.     Cardiac History:    Past Surgical History:   Procedure Laterality Date   • CARDIAC PACEMAKER PLACEMENT  07/16/2018    Dr. Coulter- [facility] dual-chamber ppm   • CARDIOVASCULAR STRESS TEST  05/08/2018    (Mod) 7 Min, 27 Secs. 7.7 METS. 76% THR. BP- 161/79. Breast Attenuation.   • CONVERTED (HISTORICAL) HOLTER  04/25/2018    @[facility]. AVG HR- 57 BPM. Minimum- 38 BPM   • CONVERTED (HISTORICAL) HOLTER  05/08/2018    4 day- avg 61 bpm, multiple sinus melissa, 6 PVC, 1 PAC   • CONVERTED (HISTORICAL) HOLTER  07/05/2018    AVG HR 48 BPM. Min 31 BPM. 2 pauses W/O symptoms.One ideoventricular run at 43 BPM   • ECHO - CONVERTED  05/08/2018    EF 65%. RVSP- 18 mmHg   • ECHO - CONVERTED  02/14/2022    TLS. EF 55%. LA- 3.9 Cm. Trace-Mild MR. RVSP- 42 mmHg   • OTHER SURGICAL HISTORY  05/23/2018    Tilt Table " Test- positive for neurocardiogenic syncope        Current Outpatient Medications   Medication Sig Dispense Refill   • amitriptyline (ELAVIL) 25 MG tablet Take 12.5 mg by mouth Every Night.     • apixaban (ELIQUIS) 5 MG tablet tablet Take 5 mg by mouth 2 (Two) Times a Day.     • Breo Ellipta 100-25 MCG/INH inhaler INHALE 1 PUFF DAILY 60 each 1   • cetirizine (zyrTEC) 10 MG tablet Take 10 mg by mouth Daily.     • cyclobenzaprine (FLEXERIL) 10 MG tablet Take 10 mg by mouth 3 (Three) Times a Day.     • DULoxetine (CYMBALTA) 20 MG capsule Take 20 mg by mouth 2 (Two) Times a Day.     • hydrOXYzine pamoate (VISTARIL) 25 MG capsule Take 50 mg by mouth Every Night.     • metFORMIN (GLUCOPHAGE) 500 MG tablet Take 500 mg by mouth 2 (Two) Times a Day With Meals.     • metoprolol tartrate (LOPRESSOR) 25 MG tablet Take 1 tablet by mouth 2 (Two) Times a Day. 180 tablet 3   • midodrine (PROAMATINE) 10 MG tablet Take 1 tablet by mouth 3 (Three) Times a Day Before Meals. 270 tablet 3   • omeprazole (priLOSEC) 20 MG capsule Take 1 capsule by mouth Daily. 90 capsule 1   • oxyCODONE-acetaminophen (PERCOCET) 5-325 MG per tablet Take 1 tablet by mouth 3 (Three) Times a Day.     • PARoxetine (PAXIL) 20 MG tablet Take 20 mg by mouth Every Morning.     • rOPINIRole (REQUIP) 0.5 MG tablet Take 0.5 mg by mouth Every Night. Take 1 hour before bedtime.     • rosuvastatin (CRESTOR) 10 MG tablet Take 1 tablet by mouth Daily. 90 tablet 2   • Semaglutide,0.25 or 0.5MG/DOS, (Ozempic, 0.25 or 0.5 MG/DOSE,) 2 MG/1.5ML solution pen-injector Inject 0.5 mg under the skin into the appropriate area as directed 1 (One) Time Per Week.     • sertraline (ZOLOFT) 100 MG tablet 2 tabs daily       No current facility-administered medications for this visit.       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)    • History of colon resection    • Hx of cholecystectomy    • Hx of tubal ligation    • Hypotension    • Low back pain    • Moderate depressive disorder    • ROGERS (obstructive sleep apnea)    • Osteoarthritis    • Palpitations    • POTS (postural orthostatic tachycardia syndrome)    • PTSD (post-traumatic stress disorder)    • Stroke (HCC)    • Vitamin D deficiency        Social History     Socioeconomic History   • Marital status:    Tobacco Use   • Smoking status: Former Smoker     Quit date: 5/3/1997     Years since quittin.2   • Smokeless tobacco: Never Used   Vaping Use   • Vaping Use: Never used   Substance and Sexual Activity   • Alcohol use: No   • Drug use: No       Family History   Problem Relation Age of Onset   • Heart disease Mother    • Clotting disorder Mother    • Hypertension Mother    • Hyperlipidemia Mother    • Asthma Mother    • Anemia Mother    • Heart disease Sister    • Hypertension Sister    • Heart attack Maternal Grandmother    • Heart attack Maternal Grandfather    • Heart attack Paternal Grandmother    • Heart attack Paternal Grandfather        Review of Systems   Constitutional: Positive for malaise/fatigue and weight loss (intentional). Negative for decreased appetite and diaphoresis.   HENT: Negative for nosebleeds.    Eyes: Negative for blurred vision.   Cardiovascular: Positive for dyspnea on exertion and palpitations. Negative for chest pain, claudication, cyanosis, leg swelling, near-syncope and orthopnea.   Respiratory: Positive for shortness of breath. Negative for snoring.    Endocrine: Negative for cold intolerance and heat intolerance.   Hematologic/Lymphatic: Negative for adenopathy and bleeding problem. Bruises/bleeds easily.   Skin: Negative for rash.   Musculoskeletal: Negative for myalgias.   Gastrointestinal: Negative for heartburn, melena and nausea.   Genitourinary:  "Negative for dysuria and hematuria.   Neurological: Positive for weakness. Negative for dizziness and light-headedness.   Psychiatric/Behavioral: The patient does not have insomnia and is not nervous/anxious.         BP Readings from Last 5 Encounters:   07/14/22 112/60   01/11/22 118/84   07/12/21 110/70   01/11/21 124/64   08/28/20 104/76       Wt Readings from Last 5 Encounters:   07/14/22 108 kg (238 lb 12.8 oz)   02/14/22 114 kg (251 lb 5.2 oz)   01/11/22 114 kg (252 lb 3.2 oz)   07/12/21 115 kg (253 lb 3.2 oz)   01/11/21 105 kg (232 lb 6.4 oz)       Objective     /60 (BP Location: Right arm)   Pulse 84   Ht 152 cm (59.84\")   Wt 108 kg (238 lb 12.8 oz)   BMI 46.89 kg/m²     Vitals and nursing note reviewed.   Eyes:      Pupils: Pupils are equal, round, and reactive to light.   HENT:      Head: Normocephalic.   Pulmonary:      Breath sounds: Normal breath sounds.   Cardiovascular:      Normal rate. Regular rhythm.      Murmurs: There is a grade 1/6 low frequency systolic murmur.   Edema:     Peripheral edema absent.   Abdominal:      General: Bowel sounds are normal.      Palpations: Abdomen is soft.   Musculoskeletal: Normal range of motion.      Cervical back: Normal range of motion. Skin:     General: Skin is warm.   Neurological:      Mental Status: Alert and oriented to person, place, and time.            ECG 12 Lead    Date/Time: 7/14/2022 7:26 AM  Performed by: Anne Mendoza APRN  Authorized by: Anne Mendoza APRN   Comparison: compared with previous ECG from 1/11/2022  Comparison to previous ECG: Sinus with non-specific T wave changes  Rhythm: sinus rhythm  Rate: normal  BPM: 84  Other findings: non-specific ST-T wave changes                 Assessment & Plan   Diagnoses and all orders for this visit:    1. SSS (sick sinus syndrome) (HCC) (Primary)  -     ECG 12 Lead    2. Presence of cardiac pacemaker  -     ECG 12 Lead    3. Palpitations  -     ECG 12 Lead    4. Neurogenic orthostatic " hypotension (HCC)    5. Murmur, cardiac    6. Hyperlipidemia LDL goal <100  -     rosuvastatin (CRESTOR) 10 MG tablet; Take 1 tablet by mouth Daily.  Dispense: 90 tablet; Refill: 2    7. ROGERS (obstructive sleep apnea)    8. POTS (postural orthostatic tachycardia syndrome)    9. Factor V Leiden (HCC)    10. Acute deep vein thrombosis (DVT) of proximal vein of lower extremity, unspecified laterality (HCC)    11. Gastroesophageal reflux disease, unspecified whether esophagitis present  -     omeprazole (priLOSEC) 20 MG capsule; Take 1 capsule by mouth Daily.  Dispense: 90 capsule; Refill: 1       Hospital discharge follow-up  - She will continue on her current plan of care from a heart standpoint.  - When she returns, we will consider repeating the echocardiogram and we also need to consider repeating a stress test due to her history, cardiac risk and length of time.   - She will continue on her blood thinner, lifelong therapy due to Factor V abnormality.     For GERD management she will return to taking omeprazole as she did not find Protonix beneficial.  She will follow-up with PCP for further management.  I complimented her on weight loss.  GERD diet information also provided.    EKG today shows sinus rhythm with nonspecific ST and T wave abnormalities which is similar to prior.   pacemaker interrogation requested to be done next available.    A 6-month follow-up visit scheduled.        Transcribed from ambient dictation for ROSE Quinn by Nicole Arciniega.  07/14/22   13:27 EDT    Patient verbalized consent to the visit recording.

## 2022-08-03 ENCOUNTER — OFFICE VISIT (OUTPATIENT)
Dept: CARDIOLOGY | Facility: CLINIC | Age: 47
End: 2022-08-03

## 2022-08-03 DIAGNOSIS — R00.2 PALPITATIONS: ICD-10-CM

## 2022-08-03 DIAGNOSIS — I49.5 SSS (SICK SINUS SYNDROME): Primary | ICD-10-CM

## 2022-08-03 PROCEDURE — 93280 PM DEVICE PROGR EVAL DUAL: CPT | Performed by: INTERNAL MEDICINE

## 2022-10-28 PROCEDURE — 93296 REM INTERROG EVL PM/IDS: CPT | Performed by: INTERNAL MEDICINE

## 2022-10-28 PROCEDURE — 93294 REM INTERROG EVL PM/LDLS PM: CPT | Performed by: INTERNAL MEDICINE

## 2023-01-23 PROBLEM — Z79.01 CURRENT USE OF LONG TERM ANTICOAGULATION: Status: ACTIVE | Noted: 2023-01-23

## 2023-01-23 PROBLEM — D68.51 FACTOR V LEIDEN: Status: ACTIVE | Noted: 2023-01-23

## 2023-01-27 PROCEDURE — 93294 REM INTERROG EVL PM/LDLS PM: CPT | Performed by: INTERNAL MEDICINE

## 2023-01-27 PROCEDURE — 93296 REM INTERROG EVL PM/IDS: CPT | Performed by: INTERNAL MEDICINE

## 2023-02-06 RX ORDER — MIDODRINE HYDROCHLORIDE 10 MG/1
TABLET ORAL
Qty: 270 TABLET | Refills: 3 | Status: SHIPPED | OUTPATIENT
Start: 2023-02-06

## 2023-02-20 ENCOUNTER — OFFICE VISIT (OUTPATIENT)
Dept: CARDIOLOGY | Facility: CLINIC | Age: 48
End: 2023-02-20
Payer: MEDICARE

## 2023-02-20 VITALS
SYSTOLIC BLOOD PRESSURE: 112 MMHG | HEART RATE: 73 BPM | BODY MASS INDEX: 46.57 KG/M2 | WEIGHT: 237.2 LBS | DIASTOLIC BLOOD PRESSURE: 60 MMHG | HEIGHT: 60 IN

## 2023-02-20 DIAGNOSIS — E78.5 HYPERLIPIDEMIA LDL GOAL <100: ICD-10-CM

## 2023-02-20 DIAGNOSIS — Z95.0 PRESENCE OF CARDIAC PACEMAKER: ICD-10-CM

## 2023-02-20 DIAGNOSIS — R00.0 TACHYCARDIA: ICD-10-CM

## 2023-02-20 DIAGNOSIS — K21.9 GASTROESOPHAGEAL REFLUX DISEASE, UNSPECIFIED WHETHER ESOPHAGITIS PRESENT: ICD-10-CM

## 2023-02-20 DIAGNOSIS — G47.33 OSA (OBSTRUCTIVE SLEEP APNEA): ICD-10-CM

## 2023-02-20 DIAGNOSIS — D68.51 FACTOR V LEIDEN: ICD-10-CM

## 2023-02-20 DIAGNOSIS — I49.5 SSS (SICK SINUS SYNDROME): Primary | ICD-10-CM

## 2023-02-20 DIAGNOSIS — G90.A POTS (POSTURAL ORTHOSTATIC TACHYCARDIA SYNDROME): ICD-10-CM

## 2023-02-20 DIAGNOSIS — R06.02 SHORTNESS OF BREATH: ICD-10-CM

## 2023-02-20 DIAGNOSIS — R00.2 PALPITATIONS: ICD-10-CM

## 2023-02-20 DIAGNOSIS — Z79.01 CURRENT USE OF LONG TERM ANTICOAGULATION: ICD-10-CM

## 2023-02-20 PROCEDURE — 99214 OFFICE O/P EST MOD 30 MIN: CPT | Performed by: NURSE PRACTITIONER

## 2023-02-20 PROCEDURE — 93000 ELECTROCARDIOGRAM COMPLETE: CPT | Performed by: NURSE PRACTITIONER

## 2023-02-20 RX ORDER — FLUTICASONE FUROATE AND VILANTEROL 100; 25 UG/1; UG/1
1 POWDER RESPIRATORY (INHALATION) DAILY
Qty: 60 EACH | Refills: 1 | Status: SHIPPED | OUTPATIENT
Start: 2023-02-20

## 2023-02-20 RX ORDER — OMEPRAZOLE 20 MG/1
20 CAPSULE, DELAYED RELEASE ORAL DAILY
Qty: 90 CAPSULE | Refills: 1 | Status: SHIPPED | OUTPATIENT
Start: 2023-02-20

## 2023-02-20 RX ORDER — ROSUVASTATIN CALCIUM 10 MG/1
10 TABLET, COATED ORAL DAILY
Qty: 90 TABLET | Refills: 3 | Status: SHIPPED | OUTPATIENT
Start: 2023-02-20

## 2023-02-20 NOTE — PROGRESS NOTES
Chief Complaint   Patient presents with   • Follow-up     Cardiac management   • Pacemaker Check     [employer] remote check  January 2023  In office pacemaker check July 2022  Patient has concerns that she has noticed her pacemaker is sticking up under skin, more than it used to.   • Palpitations     Reports she feels a skip in her heartbeat at times , and usually has to sit and rest    • Shortness of Breath     Reports dizziness and SOA  with standing and lying down   • LABS     Had labs per PCP , shows active clotting factor   • Med Refill     Needs refills on Metoprolol, Prilosec Crestor 90 day supply and Breo inhaler to [facility] pharmacy .       Subjective       Kathy Peña is a 47 y.o. female  with PTSD, palpitations, depression, SSS, POTS, and history of seizures secondary to head trauma at age [age]. She saw Dr. Conde for her initial consult on 5/3/18 for palpitations and syncope.  Holter monitor melissa with no significant pause or arrhythmia, repeated 4 day monitor here was essentially the same. She did not have syncope while wearing monitor. Nuclear stress showed no ischemia and normal LV function. She continued to report syncopal episodes and extended monitor showed several episodes of marked bradycardia (lowest 31), (2) 2 second pauses, and idioventricular run with rate of 43. She was  then referred to Dr. Coulter for SJM PPM placement for SSS. She underwent TTT which was positive for neurocardiogenic syncope, diagnosed with POTS, managed with midodrine then Northera. She was evaluated by EP for high rates and remote monitor. They felt she was stable. Rule out sleep apnea. No changes made. She was seen by pulmonary with plan to PFT as she was felt to have stress induced asthma. Sleep study revealed very mild apnea while supine.     On January 27, 2023 remote [employer]wan pacemaker interrogation showed normal device function with battery life of 6.33 years remaining.    Today she returns to the office  for follow-up visit.  Her main concern is episodes of tachycardia.  When heart rate increases her blood pressure lowers.  When these events happen she feels weak and lightheaded but denies syncope.  She remains on Eliquis therapy and bleeding denied.      Cardiac History:    Past Surgical History:   Procedure Laterality Date   • CARDIAC PACEMAKER PLACEMENT  07/16/2018    Dr. Coulter- ROSAURA dual-chamber ppm   • CARDIOVASCULAR STRESS TEST  05/08/2018    (Mod) 7 Min, 27 Secs. 7.7 METS. 76% THR. BP- 161/79. Breast Attenuation.   • CONVERTED (HISTORICAL) HOLTER  04/25/2018    @[facility]. AVG HR- 57 BPM. Minimum- 38 BPM   • CONVERTED (HISTORICAL) HOLTER  05/08/2018    4 day- avg 61 bpm, multiple sinus melissa, 6 PVC, 1 PAC   • CONVERTED (HISTORICAL) HOLTER  07/05/2018    AVG HR 48 BPM. Min 31 BPM. 2 pauses W/O symptoms.One ideoventricular run at 43 BPM   • ECHO - CONVERTED  05/08/2018    EF 65%. RVSP- 18 mmHg   • ECHO - CONVERTED  02/14/2022    TLS. EF 55%. LA- 3.9 Cm. Trace-Mild MR. RVSP- 42 mmHg   • ECHO - CONVERTED  05/06/2022    [facility]- LVH, EF 60%, diastolic dysfx, right heart enlargement, RVSP 43 mmHg   • OTHER SURGICAL HISTORY  05/23/2018    Tilt Table Test- positive for neurocardiogenic syncope        Current Outpatient Medications   Medication Sig Dispense Refill   • apixaban (ELIQUIS) 5 MG tablet tablet Take 5 mg by mouth 2 (Two) Times a Day.     • cetirizine (zyrTEC) 10 MG tablet Take 10 mg by mouth Daily.     • cyclobenzaprine (FLEXERIL) 10 MG tablet Take 10 mg by mouth 3 (Three) Times a Day.     • Fluticasone Furoate-Vilanterol (Breo Ellipta) 100-25 MCG/ACT aerosol powder  Inhale 1 puff Daily. 60 each 1   • metoprolol tartrate (LOPRESSOR) 25 MG tablet Take 1 tablet by mouth 2 (Two) Times a Day. 180 tablet 3   • midodrine (PROAMATINE) 10 MG tablet TAKE 1 TABLET BY MOUTH THREE TIMES DAILY BEFORE MEALS 270 tablet 3   • omeprazole (priLOSEC) 20 MG capsule Take 1 capsule by mouth Daily. 90 capsule 1   •  oxyCODONE-acetaminophen (PERCOCET) 5-325 MG per tablet Take 1 tablet by mouth 3 (Three) Times a Day.     • PARoxetine (PAXIL) 20 MG tablet Take 20 mg by mouth Every Morning.     • rOPINIRole (REQUIP) 0.5 MG tablet Take 0.5 mg by mouth Every Night. Take 1 hour before bedtime.     • rosuvastatin (CRESTOR) 10 MG tablet Take 1 tablet by mouth Daily. 90 tablet 3   • Semaglutide,0.25 or 0.5MG/DOS, (Ozempic, 0.25 or 0.5 MG/DOSE,) 2 MG/1.5ML solution pen-injector Inject 0.5 mg under the skin into the appropriate area as directed 1 (One) Time Per Week.     • sertraline (ZOLOFT) 100 MG tablet 2 tabs daily       No current facility-administered medications for this visit.       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)    • History of colon resection    • Hx of cholecystectomy    • Hx of tubal ligation    • Hypotension    • Low back pain    • Moderate depressive disorder    • Myocardial infarction ([facility]) 2022    Silent heartattack due to pe   • ROGERS (obstructive sleep apnea)    • Osteoarthritis    • Palpitations    • POTS (postural orthostatic tachycardia syndrome)    • PTSD (post-traumatic stress disorder)    • Pulmonary embolism ([facility]) 2022   • Stroke ([facility])    • Vitamin D deficiency        Social History     Socioeconomic History   • Marital status:    Tobacco Use   • Smoking status: Former     Types: Cigarettes     Quit date: 5/3/1997     Years since quittin.8   • Smokeless tobacco: Never   Vaping Use   • Vaping Use: Never used   Substance and Sexual Activity   • Alcohol use: No   • Drug use: Never   • Sexual activity: Not Currently      "Partners: Male     Birth control/protection: Hysterectomy       Family History   Problem Relation Age of Onset   • Heart disease Mother    • Clotting disorder Mother    • Hypertension Mother    • Hyperlipidemia Mother    • Asthma Mother    • Anemia Mother    • Heart disease Sister    • Hypertension Sister    • Heart attack Maternal Grandmother    • Heart attack Maternal Grandfather    • Heart attack Paternal Grandmother    • Heart attack Paternal Grandfather        Review of Systems   Constitutional: Positive for malaise/fatigue. Negative for decreased appetite, diaphoresis and fever.   HENT: Negative for nosebleeds.    Eyes: Negative for blurred vision.   Cardiovascular: Positive for palpitations. Negative for chest pain, claudication, cyanosis, dyspnea on exertion, irregular heartbeat, leg swelling, near-syncope, orthopnea, paroxysmal nocturnal dyspnea and syncope.   Respiratory: Negative for shortness of breath and sleep disturbances due to breathing.    Endocrine: Negative for cold intolerance and heat intolerance.   Hematologic/Lymphatic: Does not bruise/bleed easily.   Skin: Negative for rash.   Musculoskeletal: Negative for myalgias.   Gastrointestinal: Negative for heartburn, melena and nausea.   Genitourinary: Negative for dysuria and hematuria.   Neurological: Positive for light-headedness and weakness. Negative for dizziness.   Psychiatric/Behavioral: The patient does not have insomnia and is not nervous/anxious.         BP Readings from Last 5 Encounters:   02/20/23 112/60   07/14/22 112/60   01/11/22 118/84   07/12/21 110/70   01/11/21 124/64       Wt Readings from Last 5 Encounters:   02/20/23 108 kg (237 lb 3.2 oz)   07/14/22 108 kg (238 lb 12.8 oz)   02/14/22 114 kg (251 lb 5.2 oz)   01/11/22 114 kg (252 lb 3.2 oz)   07/12/21 115 kg (253 lb 3.2 oz)       Objective     /60 (BP Location: Left arm, Patient Position: Sitting)   Pulse 73   Ht 152 cm (59.84\")   Wt 108 kg (237 lb 3.2 oz)   BMI " 46.57 kg/m²     Vitals and nursing note reviewed.   Constitutional:       Appearance: Not in distress.   Eyes:      Conjunctiva/sclera: Conjunctivae normal.      Pupils: Pupils are equal, round, and reactive to light.   HENT:      Head: Normocephalic.   Pulmonary:      Effort: Pulmonary effort is normal.      Breath sounds: Normal breath sounds.   Cardiovascular:      PMI at left midclavicular line. Normal rate. Regular rhythm.      Murmurs: There is no murmur.   Pulses:     Intact distal pulses.   Edema:     Peripheral edema absent.   Abdominal:      General: Bowel sounds are normal.      Palpations: Abdomen is soft.   Musculoskeletal: Normal range of motion.      Cervical back: Normal range of motion and neck supple. Skin:     General: Skin is warm and dry.   Neurological:      Mental Status: Alert, oriented to person, place, and time and oriented to person, place and time.            ECG 12 Lead    Date/Time: 2/20/2023 8:08 AM  Performed by: Anne Mendoza APRN  Authorized by: Anne Mendoza APRN   Comparison: compared with previous ECG from 7/14/2022  Similar to previous ECG  Rhythm: sinus rhythm  BPM: 73  Other findings: non-specific ST-T wave changes                 Assessment & Plan   Diagnoses and all orders for this visit:    1. SSS (sick sinus syndrome) ([facility]) (Primary)  -     ECG 12 Lead  -     Holter Monitor - 72 Hour Up To 15 Days; Future    2. Palpitations  -     ECG 12 Lead    3. Tachycardia  -     Holter Monitor - 72 Hour Up To 15 Days; Future    4. Presence of cardiac pacemaker  -     Holter Monitor - 72 Hour Up To 15 Days; Future    5. POTS (postural orthostatic tachycardia syndrome)  -     metoprolol tartrate (LOPRESSOR) 25 MG tablet; Take 1 tablet by mouth 2 (Two) Times a Day.  Dispense: 180 tablet; Refill: 3    6. Factor V Leiden (HCC)    7. Current use of long term anticoagulation    8. ROGERS (obstructive sleep apnea)    9. Hyperlipidemia LDL goal <100  -     rosuvastatin (CRESTOR) 10 MG  tablet; Take 1 tablet by mouth Daily.  Dispense: 90 tablet; Refill: 3    10. Gastroesophageal reflux disease, unspecified whether esophagitis present  -     omeprazole (priLOSEC) 20 MG capsule; Take 1 capsule by mouth Daily.  Dispense: 90 capsule; Refill: 1    11. Shortness of breath  -     Fluticasone Furoate-Vilanterol (Breo Ellipta) 100-25 MCG/ACT aerosol powder ; Inhale 1 puff Daily.  Dispense: 60 each; Refill: 1       SSS/pacemaker/palpitations/POTS  -EKG shows sinus rhythm at a rate of 73 bpm.  Recent remote interrogation reviewed showing normal function with 17% atrial pacing and 2% ventricular pacing.  Battery life of 6 years remaining was noted.  -Patient admits to palpitations in form of fast heart rate.  Cardiac monitored placed to look for arrhythmia.  -Continue current dose beta-blocker  -Further recommendations based on cardiac monitor results  -Continue midodrine  -Continue good hydration  -Patient understands to avoid prolonged standing    -We will plan to schedule next in office pacemaker interrogation at follow-up visit.    Factor V  -On Eliquis therapy without bleeding concerns    ROGERS  -Compliant with CPAP therapy    Hyperlipidemia  - On Crestor, appears to be tolerating well  -Labs followed by your office    GERD  -Continue PPI.  Refills given  -GERD diet and weight loss encouraged    Further recommendations based on cardiac monitor results    Please forward copy of most recent lab results    A 6-month follow-up visit scheduled.

## 2023-05-02 ENCOUNTER — TELEPHONE (OUTPATIENT)
Dept: CARDIOLOGY | Facility: CLINIC | Age: 48
End: 2023-05-02
Payer: MEDICARE

## 2023-05-02 NOTE — TELEPHONE ENCOUNTER
Received fax from Dr. Welch for cardiac clearance for patient to have an EGD and a colonoscopy. Patient is on Eliquis and they are requesting to hold. According to our records, I do not see where patient has had any stenting. Patient has a history of stroke, PE, and DVT.         Fax [phone]

## 2023-05-02 NOTE — TELEPHONE ENCOUNTER
Caller: MADY- [facility]    Relationship: Provider    Best call back number: [phone]    What form or medical record are you requesting: CARDIAC CLEARANCE    Who is requesting this form or medical record from you: [facility]    How would you like to receive the form or medical records (pick-up, mail, fax): FAX  If fax, what is the fax number: [phone]      Timeframe paperwork needed: ASAP    Additional notes: MADY FROM [facility] CALLED IN ON PATIENTS BEHALF STATING PT WILL BE HAVING A COLONOSCOPY AND ENDOSCOPE ON 5.22.23 AND NEEDS CARDIAC CLEARANCE. SHE WILL BE FAXING OVER PAPERS.

## 2023-06-05 ENCOUNTER — TELEPHONE (OUTPATIENT)
Dept: CARDIOLOGY | Facility: CLINIC | Age: 48
End: 2023-06-05
Payer: MEDICARE

## 2023-06-05 NOTE — TELEPHONE ENCOUNTER
Received fax from Dr. Nicolas Maguire for cardiac clearance for patient to have a lumbar 5 kyphoplasty. Patient is on Eliquis, unclear if needing to hold. According to our records, I do not see where patient has had any stenting. Patient has a history of stroke and PE.           Fax [phone]

## 2023-07-16 DIAGNOSIS — E78.5 HYPERLIPIDEMIA LDL GOAL <100: ICD-10-CM

## 2023-07-28 PROCEDURE — 93294 REM INTERROG EVL PM/LDLS PM: CPT | Performed by: INTERNAL MEDICINE

## 2023-07-28 PROCEDURE — 93296 REM INTERROG EVL PM/IDS: CPT | Performed by: INTERNAL MEDICINE

## 2023-08-30 RX ORDER — ROSUVASTATIN CALCIUM 10 MG/1
10 TABLET, COATED ORAL DAILY
Qty: 90 TABLET | Refills: 3 | OUTPATIENT
Start: 2023-08-30

## 2023-09-27 ENCOUNTER — OFFICE VISIT (OUTPATIENT)
Dept: CARDIOLOGY | Facility: CLINIC | Age: 48
End: 2023-09-27
Payer: MEDICARE

## 2023-09-27 VITALS
HEIGHT: 60 IN | WEIGHT: 234.6 LBS | DIASTOLIC BLOOD PRESSURE: 70 MMHG | HEART RATE: 75 BPM | BODY MASS INDEX: 46.06 KG/M2 | SYSTOLIC BLOOD PRESSURE: 100 MMHG

## 2023-09-27 DIAGNOSIS — G47.33 OSA (OBSTRUCTIVE SLEEP APNEA): ICD-10-CM

## 2023-09-27 DIAGNOSIS — R07.89 CHEST PRESSURE: Primary | ICD-10-CM

## 2023-09-27 DIAGNOSIS — E78.5 HYPERLIPIDEMIA LDL GOAL <100: ICD-10-CM

## 2023-09-27 DIAGNOSIS — R00.2 PALPITATIONS: ICD-10-CM

## 2023-09-27 DIAGNOSIS — I49.5 SSS (SICK SINUS SYNDROME): ICD-10-CM

## 2023-09-27 DIAGNOSIS — G90.A POTS (POSTURAL ORTHOSTATIC TACHYCARDIA SYNDROME): ICD-10-CM

## 2023-09-27 DIAGNOSIS — D68.51 FACTOR V LEIDEN: ICD-10-CM

## 2023-09-27 DIAGNOSIS — R06.02 SHORTNESS OF BREATH: ICD-10-CM

## 2023-09-27 DIAGNOSIS — I82.4Y9 ACUTE DEEP VEIN THROMBOSIS (DVT) OF PROXIMAL VEIN OF LOWER EXTREMITY, UNSPECIFIED LATERALITY: ICD-10-CM

## 2023-09-27 RX ORDER — MIDODRINE HYDROCHLORIDE 10 MG/1
10 TABLET ORAL
Qty: 270 TABLET | Refills: 0 | Status: SHIPPED | OUTPATIENT
Start: 2023-09-27 | End: 2023-12-26

## 2023-09-27 RX ORDER — MELATONIN
1000 DAILY
COMMUNITY

## 2023-09-27 RX ORDER — ALBUTEROL SULFATE 90 UG/1
2 AEROSOL, METERED RESPIRATORY (INHALATION) EVERY 4 HOURS PRN
COMMUNITY

## 2023-09-27 RX ORDER — METOPROLOL TARTRATE 50 MG/1
25 TABLET, FILM COATED ORAL 2 TIMES DAILY
Qty: 180 TABLET | Refills: 3 | Status: SHIPPED | OUTPATIENT
Start: 2023-09-27

## 2023-09-27 RX ORDER — TIRZEPATIDE 7.5 MG/.5ML
INJECTION, SOLUTION SUBCUTANEOUS WEEKLY
COMMUNITY

## 2023-09-27 RX ORDER — LEVOCETIRIZINE DIHYDROCHLORIDE 5 MG/1
5 TABLET, FILM COATED ORAL EVERY EVENING
COMMUNITY

## 2023-09-27 RX ORDER — GALCANEZUMAB 120 MG/ML
INJECTION, SOLUTION SUBCUTANEOUS
COMMUNITY

## 2023-09-27 NOTE — PROGRESS NOTES
"Chief Complaint   Patient presents with    Follow-up     Cardiac management    Lab     Last labs per PCP 9/21/23.    Pacemaker Check     Last [employer] PPM interrogation 8/3/22 and last remote check 7/18/23.    Rapid Heart Rate     Has noticed since mid August, having random episodes of rapid heart rate with chest tightness. Highest heart rate 124. Shortness of breath associated with elevated rate, states \"at times feels like heart is going to beat out of my chest\".    Sleep apnea     Reports since having heart attack she has not been able to wear CPAP.     Edema     Having swelling in legs, ankles, feet and hands.    Med Refill     Needs refills on cardiac medications-90 day        HPI:  CHRISTIAN Peña is a 47 y.o.female with PTSD, palpitations, depression, SSS, POTS, and history of seizures secondary to head trauma at age [age]. She saw Dr. Conde for her initial consult on 5/3/18 for palpitations and syncope.  Holter monitor melissa with no significant pause or arrhythmia, repeated 4 day monitor here was essentially the same. She did not have syncope while wearing monitor. Nuclear stress showed no ischemia and normal LV function. She continued to report syncopal episodes and extended monitor showed several episodes of marked bradycardia (lowest 31), (2) 2 second pauses, and idioventricular run with rate of 43. She was  then referred to Dr. Coulter for SJM PPM placement for SSS. She underwent TTT which was positive for neurocardiogenic syncope, diagnosed with POTS, managed with midodrine then Northera. She was evaluated by EP for high rates and remote monitor. They felt she was stable. Rule out sleep apnea. No changes made. She was seen by pulmonary with plan to PFT as she was felt to have stress induced asthma. Sleep study revealed very mild apnea while supine.      On January 27, 2023 remote [employer] pacemaker interrogation showed normal device function with battery life of 6.33 years remaining.     She " returns today for follow-up visit.  Remote device check 7/2023 with 20% a paced and 1.7% V paced.  0 AMS, 0 high ventricular rate.  Battery life 5.83 years, 59% and normal function. She is reporting palpitations with a heart rate in the 120s with SOB and chest pressure one to two times daily. Last stress test 2018 and last echo 2/2022. Device implant 7/2018 and MI 5/2022 from DVT when she was diagnosed with factor V.  She is on Eliquis currently.  PCP manages labs which were last done 9/21/2023 and unavailable for review today.  Last echo 2/14/2022 did show some diastolic dysfunction and right heart enlargement with EF of 60%, last stress test 2018 which showed no evidence of ischemia.    Cardiac History:    Past Surgical History:   Procedure Laterality Date    CARDIAC PACEMAKER PLACEMENT  07/16/2018    Dr. Coulter- [facility] dual-chamber ppm    CARDIOVASCULAR STRESS TEST  05/08/2018    (Mod) 7 Min, 27 Secs. 7.7 METS. 76% THR. BP- 161/79. Breast Attenuation.    CONVERTED (HISTORICAL) HOLTER  04/25/2018    @RS. AVG HR- 57 BPM. Minimum- 38 BPM    CONVERTED (HISTORICAL) HOLTER  05/08/2018    4 day- avg 61 bpm, multiple sinus melissa, 6 PVC, 1 PAC    CONVERTED (HISTORICAL) HOLTER  07/05/2018    AVG HR 48 BPM. Min 31 BPM. 2 pauses W/O symptoms.One ideoventricular run at 43 BPM    CONVERTED (HISTORICAL) HOLTER  03/07/2023    2 Days. Avg 73. .  Intermittent paced beats    ECHO - CONVERTED  05/08/2018    EF 65%. RVSP- 18 mmHg    ECHO - CONVERTED  02/14/2022    TLS. EF 55%. LA- 3.9 Cm. Trace-Mild MR. RVSP- 42 mmHg    ECHO - CONVERTED  05/06/2022    [facility]- LVH, EF 60%, diastolic dysfx, right heart enlargement, RVSP 43 mmHg    OTHER SURGICAL HISTORY  05/23/2018    Tilt Table Test- positive for neurocardiogenic syncope        Current Outpatient Medications   Medication Sig Dispense Refill    albuterol sulfate  (90 Base) MCG/ACT inhaler Inhale 2 puffs Every 4 (Four) Hours As Needed for Wheezing.      apixaban (ELIQUIS) 5  MG tablet tablet Take 1 tablet by mouth 2 (Two) Times a Day. 180 tablet 3    Cariprazine HCl (Vraylar) 1.5 MG capsule capsule Take 1 capsule by mouth Daily.      Cholecalciferol 25 MCG (1000 UT) tablet Take 1 tablet by mouth Daily.      cyclobenzaprine (FLEXERIL) 10 MG tablet Take 1 tablet by mouth 3 (Three) Times a Day.      Fluticasone Furoate-Vilanterol (Breo Ellipta) 100-25 MCG/ACT aerosol powder  Inhale 1 puff Daily. 60 each 1    Galcanezumab-gnlm (Emgality) 120 MG/ML solution prefilled syringe Inject  under the skin into the appropriate area as directed Every 30 (Thirty) Days.      levocetirizine (XYZAL) 5 MG tablet Take 1 tablet by mouth Every Evening.      metoprolol tartrate (LOPRESSOR) 50 MG tablet Take 0.5 tablets by mouth 2 (Two) Times a Day. 180 tablet 3    midodrine (PROAMATINE) 10 MG tablet Take 1 tablet by mouth 3 (Three) Times a Day Before Meals for 90 days. 270 tablet 0    omeprazole (priLOSEC) 20 MG capsule Take 1 capsule by mouth Daily. 90 capsule 1    oxyCODONE-acetaminophen (PERCOCET) 5-325 MG per tablet Take 1 tablet by mouth 3 (Three) Times a Day.      PARoxetine (PAXIL) 20 MG tablet Take 1 tablet by mouth Every Morning.      Rimegepant Sulfate (NURTEC) 75 MG tablet dispersible tablet Take  by mouth As Needed (for migraines).      rOPINIRole (REQUIP) 0.5 MG tablet Take 1 tablet by mouth Every Night. Take 1 hour before bedtime.      rosuvastatin (CRESTOR) 10 MG tablet Take 1 tablet by mouth Daily. 90 tablet 3    sertraline (ZOLOFT) 100 MG tablet Take 1 tablet by mouth 2 (Two) Times a Day.      Tirzepatide (Mounjaro) 7.5 MG/0.5ML solution pen-injector Inject  under the skin into the appropriate area as directed 1 (One) Time Per Week.       No current facility-administered medications for this visit.       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)     History of colon resection     Hx of cholecystectomy     Hx of kyphoplasty 06/10/2023    Hx of tubal ligation     Hypotension     Low back pain     Moderate depressive disorder     Myocardial infarction 2022    Silent heartattack due to pe    ROGERS (obstructive sleep apnea)     Osteoarthritis     Palpitations     POTS (postural orthostatic tachycardia syndrome)     PTSD (post-traumatic stress disorder)     Pulmonary embolism 2022    Stroke     Vitamin D deficiency        Social History     Socioeconomic History    Marital status:    Tobacco Use    Smoking status: Former     Types: Cigarettes     Quit date: 5/3/1997     Years since quittin.4    Smokeless tobacco: Never   Vaping Use    Vaping Use: Never used   Substance and Sexual Activity    Alcohol use: No    Drug use: Never    Sexual activity: Not Currently     Partners: Male     Birth control/protection: Hysterectomy       Family History   Problem Relation Age of Onset    Heart disease Mother     Clotting disorder Mother     Hypertension Mother     Hyperlipidemia Mother     Asthma Mother     Anemia Mother     Heart disease Sister     Hypertension Sister     Heart attack Maternal Grandmother     Heart attack Maternal Grandfather     Heart attack Paternal Grandmother     Heart attack Paternal Grandfather        Vitals:   /70 (BP Location: Right arm)   Pulse 75   Ht 152 cm (59.84\")   Wt 106 kg (234 lb 9.6 oz)   BMI 46.06 kg/m²     Physical Exam  Vitals and nursing note reviewed.   Constitutional:       Appearance: She is obese.   Neck:      Vascular: No carotid bruit.   Cardiovascular:      Rate and Rhythm: Normal rate and regular rhythm.      Pulses: Normal pulses.      Heart sounds: Normal heart sounds. No murmur heard.    No friction rub. " No gallop.      Comments: PPM in place  Pulmonary:      Effort: Pulmonary effort is normal.      Breath sounds: Normal breath sounds and air entry.   Musculoskeletal:      Right lower leg: No edema.      Left lower leg: No edema.   Skin:     General: Skin is warm and dry.      Capillary Refill: Capillary refill takes less than 2 seconds.   Neurological:      Mental Status: She is alert and oriented to person, place, and time.       ECG 12 Lead    Date/Time: 9/27/2023 3:31 PM  Performed by: Berkley Tomlinson APRN  Authorized by: Berkley Tomlinson APRN   Comparison: compared with previous ECG from 2/20/2023  Similar to previous ECG  Rhythm: sinus rhythm  Rate: normal  BPM: 75  QRS axis: normal    Clinical impression: non-specific ECG  Comments: QTc 422 MS         Assessment & Plan     Diagnoses and all orders for this visit:    1. Chest pressure (Primary)  -     Stress Test With Myocardial Perfusion One Day; Future  -     Adult Transthoracic Echo Complete W/ Cont if Necessary Per Protocol; Future    2. POTS (postural orthostatic tachycardia syndrome)  -     metoprolol tartrate (LOPRESSOR) 50 MG tablet; Take 0.5 tablets by mouth 2 (Two) Times a Day.  Dispense: 180 tablet; Refill: 3  -     midodrine (PROAMATINE) 10 MG tablet; Take 1 tablet by mouth 3 (Three) Times a Day Before Meals for 90 days.  Dispense: 270 tablet; Refill: 0  -     ECG 12 Lead    3. Factor V Leiden  -     apixaban (ELIQUIS) 5 MG tablet tablet; Take 1 tablet by mouth 2 (Two) Times a Day.  Dispense: 180 tablet; Refill: 3    4. Acute deep vein thrombosis (DVT) of proximal vein of lower extremity, unspecified laterality  -     apixaban (ELIQUIS) 5 MG tablet tablet; Take 1 tablet by mouth 2 (Two) Times a Day.  Dispense: 180 tablet; Refill: 3    5. Shortness of breath  -     Stress Test With Myocardial Perfusion One Day; Future  -     Adult Transthoracic Echo Complete W/ Cont if Necessary Per Protocol; Future    6. Palpitations  -     Stress Test With Myocardial  Perfusion One Day; Future  -     Adult Transthoracic Echo Complete W/ Cont if Necessary Per Protocol; Future    7. SSS (sick sinus syndrome)  -     midodrine (PROAMATINE) 10 MG tablet; Take 1 tablet by mouth 3 (Three) Times a Day Before Meals for 90 days.  Dispense: 270 tablet; Refill: 0  -     ECG 12 Lead    8. Hyperlipidemia LDL goal <100    9. ROGERS (obstructive sleep apnea)    Chest pressure/shortness of breath/palpitations  - Repeat cardiac work-up including stress test and echocardiogram  - Increase metoprolol to 50 mg twice daily    Factor V/DVT history  - Continue Eliquis 5 mg twice daily    POTS/SSS/PPM  - Patient reports palpitation in the form of fast heart rate; remote monitoring reviewed last was 7/2023  - Increase metoprolol to 50 mg twice daily  - EKG today shows normal sinus rhythm with a rate of 75 bpm  - Continue midodrine 10 mg 3 times daily  - Increase fluid intake and avoid prolonged standing    Hyperlipidemia  - Continue Crestor  - Labs followed by PCP    ROGERS  - Follows with pulmonology  - Reports unable to tolerate CPAP with a full mask so has not been using it  - Recommend follow-up with pulmonology to find a mask that she may tolerate    Increase metoprolol to 50 mg BID if BP drops below 100 SBP, consider changing from midodrine to Florinef.  Visit Diagnoses:    ICD-10-CM ICD-9-CM   1. Chest pressure  R07.89 786.59   2. POTS (postural orthostatic tachycardia syndrome)  G90.A 427.89   3. Factor V Leiden  D68.51 289.81   4. Acute deep vein thrombosis (DVT) of proximal vein of lower extremity, unspecified laterality  I82.4Y9 453.41   5. Shortness of breath  R06.02 786.05   6. Palpitations  R00.2 785.1   7. SSS (sick sinus syndrome)  I49.5 427.81   8. Hyperlipidemia LDL goal <100  E78.5 272.4   9. ROGERS (obstructive sleep apnea)  G47.33 327.23       Meds Ordered During Visit:  New Medications Ordered This Visit   Medications    metoprolol tartrate (LOPRESSOR) 50 MG tablet     Sig: Take 0.5 tablets  by mouth 2 (Two) Times a Day.     Dispense:  180 tablet     Refill:  3    apixaban (ELIQUIS) 5 MG tablet tablet     Sig: Take 1 tablet by mouth 2 (Two) Times a Day.     Dispense:  180 tablet     Refill:  3    midodrine (PROAMATINE) 10 MG tablet     Sig: Take 1 tablet by mouth 3 (Three) Times a Day Before Meals for 90 days.     Dispense:  270 tablet     Refill:  0     **Patient requests 90 days supply**       Follow Up Appointment:   Return in about 6 months (around 3/27/2024), or sooner if needed.           This document has been electronically signed by ROSE Whitlock  September 27, 2023 15:42 EDT    Dictated Utilizing Dragon Dictation: Part of this note may be an electronic transcription/translation of spoken language to printed text using the Dragon Dictation System.

## 2023-09-28 DIAGNOSIS — K21.9 GASTROESOPHAGEAL REFLUX DISEASE, UNSPECIFIED WHETHER ESOPHAGITIS PRESENT: ICD-10-CM

## 2023-09-28 RX ORDER — OMEPRAZOLE 20 MG/1
20 CAPSULE, DELAYED RELEASE ORAL DAILY
Qty: 90 CAPSULE | Refills: 1 | Status: SHIPPED | OUTPATIENT
Start: 2023-09-28

## 2023-10-05 ENCOUNTER — HOSPITAL ENCOUNTER (OUTPATIENT)
Dept: CARDIOLOGY | Facility: HOSPITAL | Age: 48
Discharge: HOME | End: 2023-10-05
Payer: MEDICARE

## 2023-10-05 DIAGNOSIS — R07.89 CHEST PRESSURE: ICD-10-CM

## 2023-10-05 DIAGNOSIS — R00.2 PALPITATIONS: ICD-10-CM

## 2023-10-05 DIAGNOSIS — R06.02 SHORTNESS OF BREATH: ICD-10-CM

## 2023-10-05 PROCEDURE — 78452 HT MUSCLE IMAGE SPECT MULT: CPT

## 2023-10-05 PROCEDURE — 25010000002 REGADENOSON 0.4 MG/5ML SOLUTION: Performed by: INTERNAL MEDICINE

## 2023-10-05 PROCEDURE — A9500 TC99M SESTAMIBI: HCPCS | Performed by: INTERNAL MEDICINE

## 2023-10-05 PROCEDURE — 93306 TTE W/DOPPLER COMPLETE: CPT

## 2023-10-05 PROCEDURE — 0 TECHNETIUM SESTAMIBI: Performed by: INTERNAL MEDICINE

## 2023-10-05 PROCEDURE — 93017 CV STRESS TEST TRACING ONLY: CPT

## 2023-10-05 RX ORDER — REGADENOSON 0.08 MG/ML
0.4 INJECTION, SOLUTION INTRAVENOUS
Status: COMPLETED | OUTPATIENT
Start: 2023-10-05 | End: 2023-10-05

## 2023-10-05 RX ADMIN — TECHNETIUM TC 99M SESTAMIBI 1 DOSE: 1 INJECTION INTRAVENOUS at 08:11

## 2023-10-05 RX ADMIN — REGADENOSON 0.4 MG: 0.08 INJECTION, SOLUTION INTRAVENOUS at 09:32

## 2023-10-05 RX ADMIN — TECHNETIUM TC 99M SESTAMIBI 1 DOSE: 1 INJECTION INTRAVENOUS at 09:32

## 2023-10-06 LAB
BH CV ECHO MEAS - ACS: 1.99 CM
BH CV ECHO MEAS - AO MAX PG: 6.7 MMHG
BH CV ECHO MEAS - AO MEAN PG: 4 MMHG
BH CV ECHO MEAS - AO ROOT DIAM: 3 CM
BH CV ECHO MEAS - AO V2 MAX: 129 CM/SEC
BH CV ECHO MEAS - AO V2 VTI: 26.6 CM
BH CV ECHO MEAS - EDV(CUBED): 141.4 ML
BH CV ECHO MEAS - EDV(MOD-SP4): 76.1 ML
BH CV ECHO MEAS - EF(MOD-SP4): 66.1 %
BH CV ECHO MEAS - EF_3D-VOL: 49 %
BH CV ECHO MEAS - ESV(CUBED): 27.3 ML
BH CV ECHO MEAS - ESV(MOD-SP4): 25.8 ML
BH CV ECHO MEAS - FS: 42.2 %
BH CV ECHO MEAS - IVS/LVPW: 0.96 CM
BH CV ECHO MEAS - IVSD: 1.05 CM
BH CV ECHO MEAS - LA DIMENSION: 3.4 CM
BH CV ECHO MEAS - LAT PEAK E' VEL: 9.1 CM/SEC
BH CV ECHO MEAS - LV DIASTOLIC VOL/BSA (35-75): 38.6 CM2
BH CV ECHO MEAS - LV MASS(C)D: 213.3 GRAMS
BH CV ECHO MEAS - LV SYSTOLIC VOL/BSA (12-30): 13.1 CM2
BH CV ECHO MEAS - LVIDD: 5.2 CM
BH CV ECHO MEAS - LVIDS: 3 CM
BH CV ECHO MEAS - LVPWD: 1.09 CM
BH CV ECHO MEAS - MED PEAK E' VEL: 6.3 CM/SEC
BH CV ECHO MEAS - MV A MAX VEL: 86.1 CM/SEC
BH CV ECHO MEAS - MV DEC TIME: 0.16 SEC
BH CV ECHO MEAS - MV E MAX VEL: 87 CM/SEC
BH CV ECHO MEAS - MV E/A: 1.01
BH CV ECHO MEAS - RAP SYSTOLE: 10 MMHG
BH CV ECHO MEAS - RVSP: 36.6 MMHG
BH CV ECHO MEAS - SI(MOD-SP4): 25.5 ML/M2
BH CV ECHO MEAS - SV(MOD-SP4): 50.3 ML
BH CV ECHO MEAS - TR MAX PG: 26.6 MMHG
BH CV ECHO MEAS - TR MAX VEL: 257.9 CM/SEC
BH CV ECHO MEASUREMENTS AVERAGE E/E' RATIO: 11.3
BH CV REST NUCLEAR ISOTOPE DOSE: 10 MCI
BH CV STRESS COMMENTS STAGE 1: NORMAL
BH CV STRESS DOSE REGADENOSON STAGE 1: 0.4
BH CV STRESS DURATION MIN STAGE 1: 0
BH CV STRESS DURATION SEC STAGE 1: 10
BH CV STRESS NUCLEAR ISOTOPE DOSE: 30 MCI
BH CV STRESS PROTOCOL 1: NORMAL
BH CV STRESS RECOVERY BP: NORMAL MMHG
BH CV STRESS RECOVERY HR: 78 BPM
BH CV STRESS STAGE 1: 1
BH CV XLRA - RV BASE: 2.8 CM
BH CV XLRA - RV LENGTH: 7.1 CM
BH CV XLRA - RV MID: 2.6 CM
LEFT ATRIUM VOLUME INDEX: 23.4 ML/M2
LV EF NUC BP: 79 %
MAXIMAL PREDICTED HEART RATE: 175 BPM
PERCENT MAX PREDICTED HR: 47.43 %
STRESS BASELINE BP: NORMAL MMHG
STRESS BASELINE HR: 64 BPM
STRESS PERCENT HR: 56 %
STRESS POST PEAK HR: 83 BPM
STRESS TARGET HR: 149 BPM

## 2023-10-09 ENCOUNTER — TELEPHONE (OUTPATIENT)
Dept: CARDIOLOGY | Facility: CLINIC | Age: 48
End: 2023-10-09
Payer: MEDICARE

## 2023-10-09 NOTE — TELEPHONE ENCOUNTER
Received fax from Dr. Welch for cardiac clearance for patient to have an EGD and/or colonoscopy. Patient is on Eliquis and they are requesting to hold. According to our records, I do not see where patient has had any stenting. Patient has a history of stroke and PE.          Fax [phone]

## 2023-10-27 PROCEDURE — 93294 REM INTERROG EVL PM/LDLS PM: CPT | Performed by: INTERNAL MEDICINE

## 2023-10-27 PROCEDURE — 93296 REM INTERROG EVL PM/IDS: CPT | Performed by: INTERNAL MEDICINE

## 2023-11-08 DIAGNOSIS — G90.A POTS (POSTURAL ORTHOSTATIC TACHYCARDIA SYNDROME): ICD-10-CM

## 2023-11-08 RX ORDER — METOPROLOL TARTRATE 50 MG/1
50 TABLET, FILM COATED ORAL 2 TIMES DAILY
Qty: 180 TABLET | Refills: 3 | Status: SHIPPED | OUTPATIENT
Start: 2023-11-08

## 2023-11-08 NOTE — TELEPHONE ENCOUNTER
----- Message from Kathy Peña sent at 11/8/2023 11:44 AM EST -----  Regarding: Metropolol   Contact: [phone]  Can you please call in a correct dosage of the Metropolol(not sure if I’m spelling it correctly) refer to the previous email about dosage being incorrect. Thank you as I’m out and they will not refill due to being to early, but that is because of the dosage being wrong

## 2023-11-08 NOTE — TELEPHONE ENCOUNTER
Script for metoprolol 50 mg BID is pended to be sent to [facility] in [place].

## 2023-11-20 ENCOUNTER — TELEPHONE (OUTPATIENT)
Dept: CARDIOLOGY | Facility: CLINIC | Age: 48
End: 2023-11-20
Payer: MEDICARE

## 2023-11-20 NOTE — TELEPHONE ENCOUNTER
Caller: Kathy Peña    Relationship: Self    Best call back number: [phone]     What orders are you requesting (i.e. lab or imaging): REFERRAL TO     In what timeframe would the patient need to come in: ASAP    Where will you receive your lab/imaging services: 'S OFFICE    Additional notes: PATIENT IS UNABLE TO STAY WITH THIS OFFICE DUE TO HER INSURANCE. SHE IS NEEDING A REFERRAL AND PERTINENT RECORDS SENT TO 'S OFFICE.

## 2023-11-20 NOTE — TELEPHONE ENCOUNTER
Spoke with pt, instructed to come in and sign release to have records sent to Dr Coulter.    She is planning on coming tomorrow.

## 2023-12-06 ENCOUNTER — TELEPHONE (OUTPATIENT)
Dept: CARDIOLOGY | Facility: CLINIC | Age: 48
End: 2023-12-06
Payer: MEDICARE

## 2023-12-06 NOTE — TELEPHONE ENCOUNTER
"Called patient in regards to remote monitoring and insurance being out of network for the 2024 year. Left message for return call. Appt note states, \"provide redirected\"  "

## 2024-01-04 ENCOUNTER — TELEPHONE (OUTPATIENT)
Dept: CARDIOLOGY | Facility: CLINIC | Age: 49
End: 2024-01-04
Payer: MEDICARE

## 2024-01-04 NOTE — TELEPHONE ENCOUNTER
Caller: Kathy Peña    Relationship: Self    Best call back number: [phone]    What form or medical record are you requesting: ALL MEDICAL RECORDS    Who is requesting this form or medical record from you: PATIENT    How would you like to receive the form or medical records (pick-up, mail, fax): FAX  If fax, what is the fax number: [phone]      Timeframe paperwork needed: ASAP    Additional notes: PLEASE FAX ALL MEDICAL RECORDS TO THE OFFICE OF DR CHEEMA AT THE ABOVE NUMBER

## 2024-01-16 ENCOUNTER — TELEPHONE (OUTPATIENT)
Dept: CARDIOLOGY | Facility: CLINIC | Age: 49
End: 2024-01-16
Payer: MEDICARE

## 2024-01-16 NOTE — TELEPHONE ENCOUNTER
Per chart patient requested records because she is transferring care to . Called patient in regards to her remote monitoring. Received busy signal.

## 2024-02-15 DIAGNOSIS — R06.02 SHORTNESS OF BREATH: ICD-10-CM

## 2024-02-15 RX ORDER — FLUTICASONE FUROATE AND VILANTEROL TRIFENATATE 100; 25 UG/1; UG/1
1 POWDER RESPIRATORY (INHALATION) DAILY
Qty: 180 EACH | OUTPATIENT
Start: 2024-02-15

## 2024-03-08 DIAGNOSIS — K21.9 GASTROESOPHAGEAL REFLUX DISEASE, UNSPECIFIED WHETHER ESOPHAGITIS PRESENT: ICD-10-CM

## 2024-03-08 RX ORDER — OMEPRAZOLE 20 MG/1
20 CAPSULE, DELAYED RELEASE ORAL DAILY
Qty: 90 CAPSULE | Refills: 1 | OUTPATIENT
Start: 2024-03-08

## 2024-04-10 DIAGNOSIS — I49.5 SSS (SICK SINUS SYNDROME): ICD-10-CM

## 2024-04-10 DIAGNOSIS — G90.A POTS (POSTURAL ORTHOSTATIC TACHYCARDIA SYNDROME): ICD-10-CM

## 2024-04-10 RX ORDER — MIDODRINE HYDROCHLORIDE 10 MG/1
10 TABLET ORAL
Qty: 270 TABLET | Refills: 0 | Status: SHIPPED | OUTPATIENT
Start: 2024-04-10

## 2024-05-12 DIAGNOSIS — E78.5 HYPERLIPIDEMIA LDL GOAL <100: ICD-10-CM

## 2024-05-14 RX ORDER — ROSUVASTATIN CALCIUM 10 MG/1
10 TABLET, COATED ORAL DAILY
Qty: 90 TABLET | Refills: 1 | OUTPATIENT
Start: 2024-05-14

## 2024-11-16 DIAGNOSIS — G90.A POTS (POSTURAL ORTHOSTATIC TACHYCARDIA SYNDROME): ICD-10-CM

## 2024-11-18 NOTE — TELEPHONE ENCOUNTER
Rx Refill Note  Requested Prescriptions     Pending Prescriptions Disp Refills    metoprolol tartrate (LOPRESSOR) 50 MG tablet [Pharmacy Med Name: METOPROLOL TARTRATE 50MG TABLETS] 180 tablet 3     Sig: TAKE 1 TABLET BY MOUTH TWICE DAILY      Last office visit with prescribing clinician: 9/27/2023   Last telemedicine visit with prescribing clinician: Visit date not found   Next office visit with prescribing clinician: Visit date not found                         Would you like a call back once the refill request has been completed: [] Yes [] No    If the office needs to give you a call back, can they leave a voicemail: [] Yes [] No    Rosalie Russ, [facility]  11/18/24, 16:13 EST

## 2024-11-20 RX ORDER — METOPROLOL TARTRATE 50 MG
50 TABLET ORAL 2 TIMES DAILY
Qty: 180 TABLET | Refills: 3 | Status: SHIPPED | OUTPATIENT
Start: 2024-11-20

## 2025-05-02 DIAGNOSIS — G90.A POTS (POSTURAL ORTHOSTATIC TACHYCARDIA SYNDROME): ICD-10-CM

## 2025-05-02 DIAGNOSIS — I49.5 SSS (SICK SINUS SYNDROME): ICD-10-CM

## 2025-05-02 RX ORDER — MIDODRINE HYDROCHLORIDE 10 MG/1
10 TABLET ORAL
Qty: 270 TABLET | Refills: 0 | OUTPATIENT
Start: 2025-05-02